# Patient Record
Sex: MALE | Race: BLACK OR AFRICAN AMERICAN | NOT HISPANIC OR LATINO | Employment: UNEMPLOYED | URBAN - METROPOLITAN AREA
[De-identification: names, ages, dates, MRNs, and addresses within clinical notes are randomized per-mention and may not be internally consistent; named-entity substitution may affect disease eponyms.]

---

## 2020-01-26 ENCOUNTER — HOSPITAL ENCOUNTER (EMERGENCY)
Facility: HOSPITAL | Age: 30
Discharge: HOME | End: 2020-01-27
Attending: EMERGENCY MEDICINE | Admitting: EMERGENCY MEDICINE
Payer: COMMERCIAL

## 2020-01-26 VITALS
TEMPERATURE: 98.2 F | WEIGHT: 235.67 LBS | SYSTOLIC BLOOD PRESSURE: 149 MMHG | RESPIRATION RATE: 20 BRPM | HEART RATE: 125 BPM | DIASTOLIC BLOOD PRESSURE: 93 MMHG | OXYGEN SATURATION: 99 %

## 2020-01-26 DIAGNOSIS — M62.830 SPASM OF THORACIC BACK MUSCLE: Primary | ICD-10-CM

## 2020-01-26 PROCEDURE — 99283 EMERGENCY DEPT VISIT LOW MDM: CPT

## 2020-01-26 PROCEDURE — 96372 THER/PROPH/DIAG INJ SC/IM: CPT

## 2020-01-26 PROCEDURE — 99284 EMERGENCY DEPT VISIT MOD MDM: CPT | Performed by: EMERGENCY MEDICINE

## 2020-01-26 RX ORDER — KETOROLAC TROMETHAMINE 30 MG/ML
30 INJECTION, SOLUTION INTRAMUSCULAR; INTRAVENOUS ONCE
Status: COMPLETED | OUTPATIENT
Start: 2020-01-26 | End: 2020-01-26

## 2020-01-26 RX ORDER — DIAZEPAM 5 MG/1
5 TABLET ORAL ONCE
Status: COMPLETED | OUTPATIENT
Start: 2020-01-26 | End: 2020-01-26

## 2020-01-26 RX ORDER — LIDOCAINE 50 MG/G
1 PATCH TOPICAL ONCE
Status: DISCONTINUED | OUTPATIENT
Start: 2020-01-26 | End: 2020-01-27 | Stop reason: HOSPADM

## 2020-01-26 RX ORDER — TRAMADOL HYDROCHLORIDE 50 MG/1
50 TABLET ORAL ONCE
Status: COMPLETED | OUTPATIENT
Start: 2020-01-26 | End: 2020-01-26

## 2020-01-26 RX ADMIN — TRAMADOL HYDROCHLORIDE 50 MG: 50 TABLET, FILM COATED ORAL at 23:22

## 2020-01-26 RX ADMIN — KETOROLAC TROMETHAMINE 30 MG: 30 INJECTION, SOLUTION INTRAMUSCULAR at 23:23

## 2020-01-26 RX ADMIN — LIDOCAINE 1 PATCH: 50 PATCH TOPICAL at 23:22

## 2020-01-26 RX ADMIN — DIAZEPAM 5 MG: 5 TABLET ORAL at 23:23

## 2020-01-27 RX ORDER — LIDOCAINE 50 MG/G
1 PATCH TOPICAL EVERY 24 HOURS
Qty: 30 PATCH | Refills: 0 | Status: SHIPPED | OUTPATIENT
Start: 2020-01-27 | End: 2020-02-26

## 2020-01-27 RX ORDER — IBUPROFEN 800 MG/1
800 TABLET ORAL EVERY 8 HOURS PRN
Qty: 30 TABLET | Refills: 0 | Status: SHIPPED | OUTPATIENT
Start: 2020-01-27 | End: 2020-02-06

## 2020-01-27 RX ORDER — DIAZEPAM 5 MG/1
5 TABLET ORAL 2 TIMES DAILY
Qty: 20 TABLET | Refills: 0 | Status: SHIPPED | OUTPATIENT
Start: 2020-01-27 | End: 2020-02-06

## 2020-01-27 RX ORDER — TRAMADOL HYDROCHLORIDE 50 MG/1
50 TABLET ORAL EVERY 6 HOURS PRN
Qty: 20 TABLET | Refills: 0 | Status: SHIPPED | OUTPATIENT
Start: 2020-01-27 | End: 2020-02-01

## 2020-01-27 NOTE — ED PROVIDER NOTES
History  Chief Complaint   Patient presents with    Back Pain     L lower back pain for 3 days after moving boxes at home  Increased pain with movement of L leg, bending, attempting to sit  31 yo male who reports 3 days of mild L sided lateral thoracic muscle spasms which have worsened  Reports carrying some heavy boxes and lundry up stair "but its weird, the plumbing in my house, you have to hold it over your head and twist oddly to make it to the top, I went up and down it so many times  History provided by:  Patient   used: No    Back Pain   Location:  Thoracic spine  Quality:  Stiffness (spasming)  Radiates to:  Does not radiate  Pain severity:  Severe  Onset quality:  Gradual  Duration:  3 days  Timing:  Constant  Progression:  Waxing and waning (but worsening)  Chronicity:  New  Relieved by:  Nothing  Worsened by:  Palpation and movement  Ineffective treatments:  NSAIDs and OTC medications  Associated symptoms: no abdominal pain, no bladder incontinence, no chest pain, no dysuria, no fever, no headaches, no leg pain and no paresthesias        None       Past Medical History:   Diagnosis Date    Hypertension     Psychiatric disorder        History reviewed  No pertinent surgical history  History reviewed  No pertinent family history  I have reviewed and agree with the history as documented  Social History     Tobacco Use    Smoking status: Current Some Day Smoker    Smokeless tobacco: Never Used   Substance Use Topics    Alcohol use: Yes     Comment: socially     Drug use: Not Currently        Review of Systems   Constitutional: Negative for chills and fever  HENT: Negative for congestion and sore throat  Eyes: Negative for visual disturbance  Respiratory: Negative for shortness of breath and wheezing  Cardiovascular: Negative for chest pain and palpitations  Gastrointestinal: Negative for abdominal pain, diarrhea, nausea and vomiting     Genitourinary: Negative for bladder incontinence and dysuria  Musculoskeletal: Positive for back pain  Negative for neck pain and neck stiffness  Skin: Negative for pallor and rash  Neurological: Negative for headaches and paresthesias  Psychiatric/Behavioral: Negative for confusion  All other systems reviewed and are negative  Physical Exam  Physical Exam   Constitutional: He is oriented to person, place, and time  He appears well-developed and well-nourished  He appears distressed (uncomfortable)  HENT:   Head: Normocephalic and atraumatic  Right Ear: External ear normal    Left Ear: External ear normal    Mouth/Throat: Oropharynx is clear and moist    Eyes: Pupils are equal, round, and reactive to light  EOM are normal    Neck: Normal range of motion  Neck supple  Cardiovascular: Normal rate and regular rhythm  No murmur heard  Pulmonary/Chest: Effort normal and breath sounds normal    Abdominal: Soft  Bowel sounds are normal  He exhibits no distension  There is no tenderness  Musculoskeletal: Normal range of motion  He exhibits tenderness (L mid thoracic lateral tenderness and muscle spasm)  He exhibits no edema  Neurological: He is alert and oriented to person, place, and time  Skin: Skin is warm  No rash noted  No pallor  Psychiatric: He has a normal mood and affect  His behavior is normal    Nursing note and vitals reviewed        Vital Signs  ED Triage Vitals [01/26/20 2301]   Temperature Pulse Respirations Blood Pressure SpO2   98 2 °F (36 8 °C) (!) 125 20 149/93 99 %      Temp Source Heart Rate Source Patient Position - Orthostatic VS BP Location FiO2 (%)   Temporal Monitor Sitting Right arm --      Pain Score       Worst Possible Pain           Vitals:    01/26/20 2301   BP: 149/93   Pulse: (!) 125   Patient Position - Orthostatic VS: Sitting         Visual Acuity      ED Medications  Medications   lidocaine (LIDODERM) 5 % patch 1 patch (1 patch Topical Medication Applied 1/26/20 2322) ketorolac (TORADOL) injection 30 mg (30 mg Intramuscular Given 1/26/20 2323)   traMADol (ULTRAM) tablet 50 mg (50 mg Oral Given 1/26/20 2322)   diazepam (VALIUM) tablet 5 mg (5 mg Oral Given 1/26/20 2323)       Diagnostic Studies  Results Reviewed     None                 No orders to display              Procedures  Procedures         ED Course  ED Course as of Jan 27 0245   Sun Jan 26, 2020   2309 Pt seen and examined  29 yo male who reports 3 days of mild L sided lateral thoracic muscle spasms which have worsened  Reports carrying some heavy boxes and lundry up stair "but its weird, the plumbing in my house, you have to hold it over your head and twist oddly to make it to the top, I went up and down it so many times  Will place lidoderm patch, give po valium, ultram and IM toradol  Mon Jan 27, 2020   0002 Pt feeling much better, friend and ride at bedside  MDM      Disposition  Final diagnoses:   Spasm of thoracic back muscle     Time reflects when diagnosis was documented in both MDM as applicable and the Disposition within this note     Time User Action Codes Description Comment    1/27/2020 12:22 AM Jef Bustamante Add [T92 253] Spasm of thoracic back muscle       ED Disposition     ED Disposition Condition Date/Time Comment    Discharge Stable Mon Jan 27, 2020 12:22 AM Muna Dillon discharge to home/self care              Follow-up Information     Follow up With Specialties Details Why Contact Info Additional Information    [facility] Physical Therapy Schedule an appointment as soon as possible for a visit   [phone]          Discharge Medication List as of 1/27/2020 12:26 AM      START taking these medications    Details   diazepam (VALIUM) 5 mg tablet Take 1 tablet (5 mg total) by mouth 2 (two) times a day for 10 days, Starting Mon 1/27/2020, Until Thu 2/6/2020, Normal      ibuprofen (MOTRIN) 800 mg tablet Take 1 tablet (800 mg total) by mouth every 8 (eight) hours as needed for mild pain or moderate pain for up to 10 days, Starting Mon 1/27/2020, Until Thu 2/6/2020, Normal      lidocaine (LIDODERM) 5 % Apply 1 patch topically every 24 hours Remove & Discard patch within 12 hours or as directed by MD, Starting Mon 1/27/2020, Until Wed 2/26/2020, Normal      traMADol (ULTRAM) 50 mg tablet Take 1 tablet (50 mg total) by mouth every 6 (six) hours as needed for moderate pain for up to 5 days, Starting Mon 1/27/2020, Until Sat 2/1/2020, Normal           No discharge procedures on file      ED Provider  Electronically Signed by           Yoselin Dumont DO  01/27/20 6728

## 2021-01-02 ENCOUNTER — HOSPITAL ENCOUNTER (EMERGENCY)
Facility: HOSPITAL | Age: 31
Discharge: HOME | End: 2021-01-02
Attending: EMERGENCY MEDICINE | Admitting: EMERGENCY MEDICINE
Payer: COMMERCIAL

## 2021-01-02 ENCOUNTER — APPOINTMENT (EMERGENCY)
Dept: CT IMAGING | Facility: HOSPITAL | Age: 31
End: 2021-01-02
Payer: COMMERCIAL

## 2021-01-02 VITALS
HEART RATE: 68 BPM | SYSTOLIC BLOOD PRESSURE: 143 MMHG | DIASTOLIC BLOOD PRESSURE: 97 MMHG | WEIGHT: 230.6 LBS | TEMPERATURE: 97.7 F | RESPIRATION RATE: 16 BRPM | OXYGEN SATURATION: 99 %

## 2021-01-02 DIAGNOSIS — R56.9 SEIZURE (HCC): Primary | ICD-10-CM

## 2021-01-02 DIAGNOSIS — Z87.898 HISTORY OF SEIZURE: ICD-10-CM

## 2021-01-02 LAB
ALBUMIN SERPL BCP-MCNC: 3.7 G/DL (ref 3.5–5)
ALP SERPL-CCNC: 67 U/L (ref 46–116)
ALT SERPL W P-5'-P-CCNC: 40 U/L (ref 12–78)
ANION GAP SERPL CALCULATED.3IONS-SCNC: 7 MMOL/L (ref 4–13)
APAP SERPL-MCNC: <2 UG/ML (ref 10–20)
APTT PPP: 29 SECONDS (ref 23–37)
AST SERPL W P-5'-P-CCNC: 37 U/L (ref 5–45)
BASOPHILS # BLD AUTO: 0.05 THOUSANDS/ΜL (ref 0–0.1)
BASOPHILS NFR BLD AUTO: 1 % (ref 0–1)
BILIRUB SERPL-MCNC: 0.32 MG/DL (ref 0.2–1)
BUN SERPL-MCNC: 10 MG/DL (ref 5–25)
CALCIUM SERPL-MCNC: 9.7 MG/DL (ref 8.3–10.1)
CHLORIDE SERPL-SCNC: 102 MMOL/L (ref 100–108)
CO2 SERPL-SCNC: 31 MMOL/L (ref 21–32)
CREAT SERPL-MCNC: 0.59 MG/DL (ref 0.6–1.3)
EOSINOPHIL # BLD AUTO: 0.21 THOUSAND/ΜL (ref 0–0.61)
EOSINOPHIL NFR BLD AUTO: 3 % (ref 0–6)
ERYTHROCYTE [DISTWIDTH] IN BLOOD BY AUTOMATED COUNT: 14.6 % (ref 11.6–15.1)
ETHANOL SERPL-MCNC: <3 MG/DL (ref 0–3)
GFR SERPL CREATININE-BSD FRML MDRD: 155 ML/MIN/1.73SQ M
GLUCOSE SERPL-MCNC: 91 MG/DL (ref 65–140)
HCT VFR BLD AUTO: 48.4 % (ref 36.5–49.3)
HGB BLD-MCNC: 15 G/DL (ref 12–17)
IMM GRANULOCYTES # BLD AUTO: 0.02 THOUSAND/UL (ref 0–0.2)
IMM GRANULOCYTES NFR BLD AUTO: 0 % (ref 0–2)
INR PPP: 0.97 (ref 0.84–1.19)
LYMPHOCYTES # BLD AUTO: 2.3 THOUSANDS/ΜL (ref 0.6–4.47)
LYMPHOCYTES NFR BLD AUTO: 30 % (ref 14–44)
MAGNESIUM SERPL-MCNC: 2.1 MG/DL (ref 1.6–2.6)
MCH RBC QN AUTO: 26.5 PG (ref 26.8–34.3)
MCHC RBC AUTO-ENTMCNC: 31 G/DL (ref 31.4–37.4)
MCV RBC AUTO: 86 FL (ref 82–98)
MONOCYTES # BLD AUTO: 0.51 THOUSAND/ΜL (ref 0.17–1.22)
MONOCYTES NFR BLD AUTO: 7 % (ref 4–12)
NEUTROPHILS # BLD AUTO: 4.57 THOUSANDS/ΜL (ref 1.85–7.62)
NEUTS SEG NFR BLD AUTO: 59 % (ref 43–75)
NRBC BLD AUTO-RTO: 0 /100 WBCS
PLATELET # BLD AUTO: 293 THOUSANDS/UL (ref 149–390)
PMV BLD AUTO: 10.3 FL (ref 8.9–12.7)
POTASSIUM SERPL-SCNC: 5.1 MMOL/L (ref 3.5–5.3)
PROT SERPL-MCNC: 8.4 G/DL (ref 6.4–8.2)
PROTHROMBIN TIME: 12.7 SECONDS (ref 11.6–14.5)
RBC # BLD AUTO: 5.65 MILLION/UL (ref 3.88–5.62)
SALICYLATES SERPL-MCNC: 3 MG/DL (ref 3–20)
SODIUM SERPL-SCNC: 140 MMOL/L (ref 136–145)
TSH SERPL DL<=0.05 MIU/L-ACNC: 2.55 UIU/ML (ref 0.36–3.74)
WBC # BLD AUTO: 7.66 THOUSAND/UL (ref 4.31–10.16)

## 2021-01-02 PROCEDURE — 80143 DRUG ASSAY ACETAMINOPHEN: CPT | Performed by: NURSE PRACTITIONER

## 2021-01-02 PROCEDURE — 99284 EMERGENCY DEPT VISIT MOD MDM: CPT

## 2021-01-02 PROCEDURE — 80179 DRUG ASSAY SALICYLATE: CPT | Performed by: NURSE PRACTITIONER

## 2021-01-02 PROCEDURE — 82077 ASSAY SPEC XCP UR&BREATH IA: CPT | Performed by: NURSE PRACTITIONER

## 2021-01-02 PROCEDURE — 85730 THROMBOPLASTIN TIME PARTIAL: CPT | Performed by: NURSE PRACTITIONER

## 2021-01-02 PROCEDURE — 99285 EMERGENCY DEPT VISIT HI MDM: CPT | Performed by: NURSE PRACTITIONER

## 2021-01-02 PROCEDURE — 36415 COLL VENOUS BLD VENIPUNCTURE: CPT | Performed by: NURSE PRACTITIONER

## 2021-01-02 PROCEDURE — 83735 ASSAY OF MAGNESIUM: CPT | Performed by: NURSE PRACTITIONER

## 2021-01-02 PROCEDURE — 84443 ASSAY THYROID STIM HORMONE: CPT | Performed by: NURSE PRACTITIONER

## 2021-01-02 PROCEDURE — 80053 COMPREHEN METABOLIC PANEL: CPT | Performed by: NURSE PRACTITIONER

## 2021-01-02 PROCEDURE — 70450 CT HEAD/BRAIN W/O DYE: CPT

## 2021-01-02 PROCEDURE — 96365 THER/PROPH/DIAG IV INF INIT: CPT

## 2021-01-02 PROCEDURE — 85610 PROTHROMBIN TIME: CPT | Performed by: NURSE PRACTITIONER

## 2021-01-02 PROCEDURE — 85025 COMPLETE CBC W/AUTO DIFF WBC: CPT | Performed by: NURSE PRACTITIONER

## 2021-01-02 PROCEDURE — G1004 CDSM NDSC: HCPCS

## 2021-01-02 RX ORDER — LEVETIRACETAM 500 MG/1
500 TABLET ORAL EVERY 12 HOURS SCHEDULED
Qty: 60 TABLET | Refills: 0 | Status: SHIPPED | OUTPATIENT
Start: 2021-01-02 | End: 2021-02-01

## 2021-01-02 RX ADMIN — LEVETIRACETAM 1500 MG: 100 INJECTION, SOLUTION INTRAVENOUS at 04:46

## 2021-01-02 NOTE — DISCHARGE INSTRUCTIONS
You have been given a loading dose of keppra through the IV  You are being prescribed keppra 500 mg every 12 hours - take as directed  Return to the ED if you have symptoms or recurrent seizures  You are to call the neurology/seizure physician for an appointment   You are to find a family doctor  Call your insurance to see who is listed for you    Sami Starks has been notified by law regarding your seizure- they will speak with you regarding your license

## 2021-01-02 NOTE — ED PROVIDER NOTES
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, and at some point started asking a coworker the same question repeatedly, and then walked to the back of the store  The coworker later told him she was calling out to him but he was not responding  He has a 4-hour span of time tonight that he can not recall  He does not think he fainted or fell at any point  To his knowledge, he has been having seizures since childhood, where he would "blackout" and not remember things  He denies history of tonic-clonic events  His last major seizure was 2 years ago, where he lost memory of approx 6 months of time  Pt reports he usually urinates during seizures, but he did not tonight  He c/o severe headache x 3 days was pain radiating down his posterior neck; denies changes in vision or hearing  He states headaches and heat can trigger his seizures  He also reports that he can smell and hear things that other people can't, and sees shadows on the floor that others can not see  Pt does not know if he has ever been on seizure meds in past        History provided by:  Medical records and patient   used: No    Seizure - Prior Hx Of  Seizure activity on arrival: no    Preceding symptoms: headache    Episode characteristics: disorientation    Duration:  4 hours  Timing:  Once  Number of seizures this episode:  1  Recent head injury:  No recent head injuries  History of seizures: yes        Prior to Admission Medications   Prescriptions Last Dose Informant Patient Reported?  Taking?   diazepam (VALIUM) 5 mg tablet   No No   Sig: Take 1 tablet (5 mg total) by mouth 2 (two) times a day for 10 days   ibuprofen (MOTRIN) 800 mg tablet   No No   Sig: Take 1 tablet (800 mg total) by mouth every 8 (eight) hours as needed for mild pain or moderate pain for up to 10 days   lidocaine (LIDODERM) 5 %   No No   Sig: Apply 1 patch topically every 24 hours Remove & Discard patch within 12 hours or as directed by MD      Facility-Administered Medications: None       Past Medical History:   Diagnosis Date    Hypertension     Psychiatric disorder     Seizures ([facility] [address] )        History reviewed  No pertinent surgical history  History reviewed  No pertinent family history  I have reviewed and agree with the history as documented  E-Cigarette/Vaping     E-Cigarette/Vaping Substances     Social History     Tobacco Use    Smoking status: Current Some Day Smoker    Smokeless tobacco: Never Used   Substance Use Topics    Alcohol use: Yes     Comment: socially     Drug use: Not Currently       Review of Systems   HENT: Negative for hearing loss  Eyes: Negative for visual disturbance  Musculoskeletal: Positive for neck pain  Posterior   Neurological: Positive for seizures, weakness and headaches  Negative for dizziness, tremors, syncope, facial asymmetry, speech difficulty, light-headedness and numbness  Right arm weakness x 1 year   All other systems reviewed and are negative  Physical Exam  Physical Exam  Vitals signs and nursing note reviewed  Constitutional:       General: He is awake  Appearance: Normal appearance  He is normal weight  HENT:      Head: Normocephalic and atraumatic  Eyes:      General: No visual field deficit  Neck:      Musculoskeletal: Normal range of motion and neck supple  Cardiovascular:      Rate and Rhythm: Normal rate and regular rhythm  Heart sounds: Normal heart sounds  Pulmonary:      Effort: Pulmonary effort is normal       Breath sounds: Normal breath sounds     Musculoskeletal: Normal range of motion  Skin:     General: Skin is warm  Neurological:      Mental Status: He is alert and oriented to person, place, and time  Cranial Nerves: No cranial nerve deficit, dysarthria or facial asymmetry  Sensory: No sensory deficit  Motor: Weakness present  No tremor  Comments: RUE strength decreased   Psychiatric:         Attention and Perception: Attention normal          Mood and Affect: Mood normal          Speech: Speech normal          Behavior: Behavior normal  Behavior is cooperative  Vital Signs  ED Triage Vitals   Temperature Pulse Respirations Blood Pressure SpO2   01/02/21 0238 01/02/21 0238 01/02/21 0238 01/02/21 0238 01/02/21 0238   97 7 °F (36 5 °C) 79 16 (!) 182/110 100 %      Temp src Heart Rate Source Patient Position - Orthostatic VS BP Location FiO2 (%)   -- 01/02/21 0325 01/02/21 0325 01/02/21 0325 --    Monitor Sitting Right arm       Pain Score       01/02/21 0238       5           Vitals:    01/02/21 0238 01/02/21 0325 01/02/21 0515   BP: (!) 182/110 136/93 143/97   Pulse: 79 73 68   Patient Position - Orthostatic VS:  Sitting Sitting         Visual Acuity  Visual Acuity      Most Recent Value   L Pupil Size (mm)  3   R Pupil Size (mm)  3          ED Medications  Medications   levETIRAcetam (KEPPRA) 1,500 mg in sodium chloride 0 9 % 100 mL IVPB (0 mg Intravenous Stopped 1/2/21 0540)       Diagnostic Studies  Results Reviewed     Procedure Component Value Units Date/Time    Acetaminophen level-If concentration is detectable, please discuss with medical  on call   [[id number]]  (Abnormal) Collected: 01/02/21 0322    Lab Status: Final result Specimen: Blood from Arm, Right Updated: 01/02/21 0427     Acetaminophen Level <2 ug/mL     TSH [[id number]]  (Normal) Collected: 01/02/21 0322    Lab Status: Final result Specimen: Blood from Arm, Right Updated: 01/02/21 0402     TSH 3RD GENERATON 2 546 uIU/mL     Narrative:      Patients undergoing fluorescein dye angiography may retain small amounts of fluorescein in the body for 48-72 hours post procedure  Samples containing fluorescein can produce falsely depressed TSH values  If the patient had this procedure,a specimen should be resubmitted post fluorescein clearance        Magnesium [[id number]]  (Normal) Collected: 01/02/21 0322    Lab Status: Final result Specimen: Blood from Arm, Right Updated: 01/02/21 0402     Magnesium 2 1 mg/dL     Salicylate level [[id number]]  (Normal) Collected: 01/02/21 0322    Lab Status: Final result Specimen: Blood from Arm, Right Updated: 06/82/60 0803     Salicylate Lvl 3 0 mg/dL     Comprehensive metabolic panel [[id number]]  (Abnormal) Collected: 01/02/21 0322    Lab Status: Final result Specimen: Blood from Arm, Right Updated: 01/02/21 0401     Sodium 140 mmol/L      Potassium 5 1 mmol/L      Chloride 102 mmol/L      CO2 31 mmol/L      ANION GAP 7 mmol/L      BUN 10 mg/dL      Creatinine 0 59 mg/dL      Glucose 91 mg/dL      Calcium 9 7 mg/dL      AST 37 U/L      ALT 40 U/L      Alkaline Phosphatase 67 U/L      Total Protein 8 4 g/dL      Albumin 3 7 g/dL      Total Bilirubin 0 32 mg/dL      eGFR 155 ml/min/1 73sq m     Narrative:      [place] guidelines for Chronic Kidney Disease (CKD):     Stage 1 with normal or high GFR (GFR > 90 mL/min/1 73 square meters)    Stage 2 Mild CKD (GFR = 60-89 mL/min/1 73 square meters)    Stage 3A Moderate CKD (GFR = 45-59 mL/min/1 73 square meters)    Stage 3B Moderate CKD (GFR = 30-44 mL/min/1 73 square meters)    Stage 4 Severe CKD (GFR = 15-29 mL/min/1 73 square meters)    Stage 5 End Stage CKD (GFR <15 mL/min/1 73 square meters)  Note: GFR calculation is accurate only with a steady state creatinine    Ethanol [[id number]]  (Normal) Collected: 01/02/21 0322    Lab Status: Final result Specimen: Blood from Arm, Right Updated: 01/02/21 0356     Ethanol Lvl <3 mg/dL     Protime-INR [[id number]]  (Normal) Collected: 01/02/21 0322    Lab Status: Final result Specimen: Blood from Arm, Right Updated: 01/02/21 0343     Protime 12 7 seconds      INR 0 97    APTT [[id number]]  (Normal) Collected: 01/02/21 0322    Lab Status: Final result Specimen: Blood from Arm, Right Updated: 01/02/21 0343     PTT 29 seconds     CBC and differential [[id number]]  (Abnormal) Collected: 01/02/21 0322    Lab Status: Final result Specimen: Blood from Arm, Right Updated: 01/02/21 0334     WBC 7 66 Thousand/uL      RBC 5 65 Million/uL      Hemoglobin 15 0 g/dL      Hematocrit 48 4 %      MCV 86 fL      MCH 26 5 pg      MCHC 31 0 g/dL      RDW 14 6 %      MPV 10 3 fL      Platelets 146 Thousands/uL      nRBC 0 /100 WBCs      Neutrophils Relative 59 %      Immat GRANS % 0 %      Lymphocytes Relative 30 %      Monocytes Relative 7 %      Eosinophils Relative 3 %      Basophils Relative 1 %      Neutrophils Absolute 4 57 Thousands/µL      Immature Grans Absolute 0 02 Thousand/uL      Lymphocytes Absolute 2 30 Thousands/µL      Monocytes Absolute 0 51 Thousand/µL      Eosinophils Absolute 0 21 Thousand/µL      Basophils Absolute 0 05 Thousands/µL     Rapid drug screen, urine [[id number]]     Lab Status: No result Specimen: Urine     POCT urinalysis dipstick [[id number]]     Lab Status: No result Specimen: Urine                  CT head without contrast   Final Result by Elina Almazan MD (01/02 0406)      No acute intracranial abnormality  Workstation performed: [id number]                    Procedures  Procedures         ED Course  ED Course as of Jan 02 0617   Sat Jan 02, 2021   0411 FINDINGS:     PARENCHYMA:  No intracranial mass, mass effect or midline shift  No CT signs of acute infarction    No acute parenchymal hemorrhage      VENTRICLES AND EXTRA-AXIAL SPACES:  Normal for the patient's age      VISUALIZED ORBITS AND PARANASAL SINUSES:  Unremarkable      CALVARIUM AND EXTRACRANIAL SOFT TISSUES:  Normal      IMPRESSION:     No acute intracranial abnormality          0411 CT head negative  Labs unremarkable  All labs and radiology results reviewed - will speak with epileptologist on call  56 Spoke with Dr Heather Zamarripa regarding case and pt seizure history and no medications  She states to give 1500 mg Keppra IV and then keppra 500 q 12 hours and he is follow up with neuro/seizure as out patient  0526 Pt states he is no longer dizzy  He states he is feeling well and understands all discharge instructions and follow up  Pt has a ride home  Pt A&O x 3 no further seizures noted while in ED  Pt is stable and ambulatory at discharge  /97 (BP Location: Right arm)   Pulse 68   Temp 97 7 °F (36 5 °C)   Resp 16   Wt 105 kg (230 lb 9 6 oz)   SpO2 99%                           SBIRT 20yo+      Most Recent Value   SBIRT (25 yo +)   In order to provide better care to our patients, we are screening all of our patients for alcohol and drug use  Would it be okay to ask you these screening questions? No Filed at: 01/02/2021 0303                    MDM  Number of Diagnoses or Management Options  Diagnosis management comments: Seizure    DDX:  Seizure  Psychological disorder  Stroke    Plan:  Labs  CT of head  [employer] form completed and submitted      Disposition  Final diagnoses:   Seizure ([facility] [place] [address] )   History of seizure     Time reflects when diagnosis was documented in both MDM as applicable and the Disposition within this note     Time User Action Codes Description Comment    1/2/2021  4:26 AM Hammond Formica Add [R56 9] Seizure ([address] )     1/2/2021  4:35 AM Hammond Formica Add [Z61 495] History of seizure       ED Disposition     ED Disposition Condition Date/Time Comment    Discharge Stable Sat Jan 2, 2021  5:29 AM Aristides Reusing discharge to home/self care  Follow-up Information     Follow up With Specialties Details Why Contact Info Additional Information    [employer]fol[employer]   you need to find a family doctor  You are to call your health insurance as well to see who is listed  Antoinette Dee MD Neurology Schedule an appointment as soon as possible for a visit in 2 days you need to see the epilepsy group for your seizures [address] [facility] [phone]       [facility] Emergency Department Emergency Medicine  If symptoms worsen [facility] [id number]  [place] [address] ED, [address] [facility] , [place], [place], [address]          Discharge Medication List as of 1/2/2021  5:29 AM      START taking these medications    Details   levETIRAcetam (KEPPRA) 500 mg tablet Take 1 tablet (500 mg total) by mouth every 12 (twelve) hours, Starting Sat 1/2/2021, Until Mon 2/1/2021, Normal         CONTINUE these medications which have NOT CHANGED    Details   diazepam (VALIUM) 5 mg tablet Take 1 tablet (5 mg total) by mouth 2 (two) times a day for 10 days, Starting Mon 1/27/2020, Until Thu 2/6/2020, Normal      ibuprofen (MOTRIN) 800 mg tablet Take 1 tablet (800 mg total) by mouth every 8 (eight) hours as needed for mild pain or moderate pain for up to 10 days, Starting Mon 1/27/2020, Until Thu 2/6/2020, Normal      lidocaine (LIDODERM) 5 % Apply 1 patch topically every 24 hours Remove & Discard patch within 12 hours or as directed by MD, Starting Mon 1/27/2020, Until Wed 2/26/2020, Normal           No discharge procedures on file      PDMP Review     None          ED Provider  Electronically Signed by           Dhiraj Luna  01/02/21 6499

## 2021-01-02 NOTE — Clinical Note
Abdi Cuellar was seen and treated in our emergency department on 1/2/2021  Diagnosis:     [place]  may return to work on return date  He may return on this date: 01/04/2021         If you have any questions or concerns, please don't hesitate to call        FRANTZ Carlisle    ______________________________           _______________          _______________  Hospital Representative                              Date                                Time

## 2021-12-29 ENCOUNTER — OFFICE VISIT (OUTPATIENT)
Dept: DENTISTRY | Facility: CLINIC | Age: 31
End: 2021-12-29

## 2021-12-29 VITALS — SYSTOLIC BLOOD PRESSURE: 134 MMHG | HEART RATE: 73 BPM | TEMPERATURE: 96.8 F | DIASTOLIC BLOOD PRESSURE: 97 MMHG

## 2021-12-29 DIAGNOSIS — K02.61 DENTAL CARIES ON SMOOTH SURFACE LIMITED TO ENAMEL: Primary | ICD-10-CM

## 2021-12-29 RX ORDER — AMOXICILLIN 500 MG/1
500 CAPSULE ORAL EVERY 8 HOURS SCHEDULED
Qty: 21 CAPSULE | Refills: 0 | Status: SHIPPED | OUTPATIENT
Start: 2021-12-29 | End: 2022-01-05

## 2021-12-29 NOTE — PROGRESS NOTES
* pt scheduled for emergency exam and evaluation today  Filters are not working today so unable to do surgical procedures in clinic  CC- TA upper left area; pt states tooth has been broken for several years; now increasing discomfort and very careful eating on left side  Panorex- clear bilateral max sinus; tmj within normal limits; Caries #16/impacted #17/#1 and #32  PMHX- h/o seizures;see epic notes; denies taking any anti seizure meds; denies any history of drug allergies today  EOE- nsf; no swelling noted; able to open mouth without discomfort or limitations; able to eat and drink; no acute distress  IOE- Full compliment of adult dentition; fractured #16; large extensive decay present on tooth; + percussion; no mobility #16 noted  Gingiva is slightly swollen and growing over this tooth  DX- fractured/craious non restorable wisdom tooth #16  REC- surgical extraction #16 and evaluation with oral surgery  * referred out due to possible complication and history of uncontrollable seizures in past    *referrral given to patient and copy of panorex    NV- comprehensive exam and cleaning

## 2022-01-13 ENCOUNTER — OFFICE VISIT (OUTPATIENT)
Dept: DENTISTRY | Facility: CLINIC | Age: 32
End: 2022-01-13

## 2022-01-13 VITALS — TEMPERATURE: 96.4 F

## 2022-01-13 DIAGNOSIS — Z01.21 ENCOUNTER FOR DENTAL EXAMINATION AND CLEANING WITH ABNORMAL FINDINGS: Primary | ICD-10-CM

## 2022-01-13 PROCEDURE — D0274 BITEWINGS - 4 RADIOGRAPHIC IMAGES: HCPCS | Performed by: DENTIST

## 2022-01-14 NOTE — PROGRESS NOTES
Pt presents to clinic for comprehensive exam with chief complaint of pain in the upper left posterior  Medical history reviewed  Pt is current smoker  Not currently trying to quit  Tobacco counseling performed  EOE: WNL    IOE  Soft Tissue: Oral cancer screening WNL  Generalized gingival inflammation  Hard Tissue: #16 decayed and broken down  Impacted #1  Partially erupted #17, 32    Bitewings and Pan  Findings: caries charted  Generalized heavy subgingival calculus  Generalized moderate bone loss  Full mouth perio charting not completed due to heavy calculus deposits  Discussed findings with pt  Tx options presented  Phase 1: Disease control (full mouth debridement), ext of 3rds  Phase 2: Caries Control   Phase 3: Maintenance    Explained to pt he is in need of full mouth debridement, then we will re-eval his periodontal status and complete full perio charting  Discussed pt's periodontal status, etiology of periodontal disease, and changes pt should make  Pt has referral for OS from previous apt       Nv: Full mouth debridement  Ww: Dr Rosita Sy

## 2023-01-01 ENCOUNTER — HOSPITAL ENCOUNTER (EMERGENCY)
Facility: HOSPITAL | Age: 33
Discharge: HOME | End: 2023-01-01
Attending: EMERGENCY MEDICINE

## 2023-01-01 VITALS
DIASTOLIC BLOOD PRESSURE: 83 MMHG | HEART RATE: 59 BPM | SYSTOLIC BLOOD PRESSURE: 139 MMHG | OXYGEN SATURATION: 99 % | RESPIRATION RATE: 18 BRPM | TEMPERATURE: 98 F

## 2023-01-01 DIAGNOSIS — K08.89 TOOTHACHE: Primary | ICD-10-CM

## 2023-01-01 RX ORDER — AMOXICILLIN 500 MG/1
500 CAPSULE ORAL 3 TIMES DAILY
Qty: 21 CAPSULE | Refills: 0 | Status: SHIPPED | OUTPATIENT
Start: 2023-01-01 | End: 2023-01-08

## 2023-01-01 RX ORDER — LIDOCAINE HYDROCHLORIDE 20 MG/ML
SOLUTION OROPHARYNGEAL
Qty: 100 ML | Refills: 0 | Status: SHIPPED | OUTPATIENT
Start: 2023-01-01 | End: 2023-01-01 | Stop reason: SDUPTHER

## 2023-01-01 RX ORDER — LIDOCAINE HYDROCHLORIDE 20 MG/ML
15 SOLUTION OROPHARYNGEAL ONCE
Status: COMPLETED | OUTPATIENT
Start: 2023-01-01 | End: 2023-01-01

## 2023-01-01 RX ORDER — IBUPROFEN 600 MG/1
600 TABLET ORAL EVERY 6 HOURS PRN
Qty: 30 TABLET | Refills: 0 | Status: SHIPPED | OUTPATIENT
Start: 2023-01-01 | End: 2023-01-01 | Stop reason: SDUPTHER

## 2023-01-01 RX ORDER — LIDOCAINE HYDROCHLORIDE 20 MG/ML
SOLUTION OROPHARYNGEAL
Qty: 100 ML | Refills: 0 | Status: SHIPPED | OUTPATIENT
Start: 2023-01-01

## 2023-01-01 RX ORDER — KETOROLAC TROMETHAMINE 30 MG/ML
15 INJECTION, SOLUTION INTRAMUSCULAR; INTRAVENOUS ONCE
Status: COMPLETED | OUTPATIENT
Start: 2023-01-01 | End: 2023-01-01

## 2023-01-01 RX ORDER — AMOXICILLIN 500 MG/1
500 CAPSULE ORAL 3 TIMES DAILY
Qty: 21 CAPSULE | Refills: 0 | Status: SHIPPED | OUTPATIENT
Start: 2023-01-01 | End: 2023-01-01 | Stop reason: SDUPTHER

## 2023-01-01 RX ORDER — IBUPROFEN 600 MG/1
600 TABLET ORAL EVERY 6 HOURS PRN
Qty: 30 TABLET | Refills: 0 | Status: SHIPPED | OUTPATIENT
Start: 2023-01-01 | End: 2023-01-11

## 2023-01-01 RX ADMIN — LIDOCAINE HYDROCHLORIDE 15 ML: 20 SOLUTION ORAL at 16:57

## 2023-01-01 RX ADMIN — KETOROLAC TROMETHAMINE 15 MG: 30 INJECTION, SOLUTION INTRAMUSCULAR; INTRAVENOUS at 16:57

## 2023-01-01 NOTE — DISCHARGE INSTRUCTIONS
Amoxil as directed  Ibuprofen as needed for pain  Apply topical lidocaine as needed  FU with the dentist/oral surgery

## 2023-01-01 NOTE — ED PROVIDER NOTES
History  Chief Complaint   Patient presents with   • Dental Pain     Pt presents to the ED for left upper dental pain - was to see a dentist but went in and they said they didn't actually take his insurance  - having sharp stabbing pain  Has been int for weeks but worse today  No fevers  Taking tylenol and icing area - with out relief  Prior to Admission Medications   Prescriptions Last Dose Informant Patient Reported? Taking?   diazepam (VALIUM) 5 mg tablet   No No   Sig: Take 1 tablet (5 mg total) by mouth 2 (two) times a day for 10 days   levETIRAcetam (KEPPRA) 500 mg tablet   No No   Sig: Take 1 tablet (500 mg total) by mouth every 12 (twelve) hours   lidocaine (LIDODERM) 5 %   No No   Sig: Apply 1 patch topically every 24 hours Remove & Discard patch within 12 hours or as directed by MD      Facility-Administered Medications: None       Past Medical History:   Diagnosis Date   • Hypertension    • Psychiatric disorder    • Seizures ([facility] [address] )        No past surgical history on file  No family history on file  I have reviewed and agree with the history as documented  E-Cigarette/Vaping     E-Cigarette/Vaping Substances     Social History     Tobacco Use   • Smoking status: Some Days   • Smokeless tobacco: Never   Substance Use Topics   • Alcohol use: Yes     Comment: socially    • Drug use: Not Currently       Review of Systems   Constitutional: Negative for fever  HENT: Positive for dental problem  Respiratory: Negative  Cardiovascular: Negative  Gastrointestinal: Negative  All other systems reviewed and are negative  Physical Exam  Physical Exam  Vitals and nursing note reviewed  Constitutional:       Appearance: He is well-developed  HENT:      Head: Normocephalic and atraumatic        Right Ear: Ear canal and external ear normal       Left Ear: Ear canal and external ear normal       Mouth/Throat:     Eyes:      Conjunctiva/sclera: Conjunctivae normal    Cardiovascular: Rate and Rhythm: Normal rate and regular rhythm  Heart sounds: Normal heart sounds  Pulmonary:      Effort: Pulmonary effort is normal       Breath sounds: Normal breath sounds  Abdominal:      General: Bowel sounds are normal       Palpations: Abdomen is soft  Musculoskeletal:         General: Normal range of motion  Cervical back: Normal range of motion and neck supple  Lymphadenopathy:      Cervical: No cervical adenopathy  Skin:     General: Skin is warm  Findings: No rash  Neurological:      Mental Status: He is alert and oriented to person, place, and time  Motor: No abnormal muscle tone  Coordination: Coordination normal    Psychiatric:         Behavior: Behavior normal          Vital Signs  ED Triage Vitals   Temperature Pulse Respirations Blood Pressure SpO2   01/01/23 1704 01/01/23 1704 01/01/23 1704 01/01/23 1704 01/01/23 1704   98 °F (36 7 °C) 59 18 139/83 99 %      Temp Source Heart Rate Source Patient Position - Orthostatic VS BP Location FiO2 (%)   01/01/23 1704 01/01/23 1704 01/01/23 1704 01/01/23 1704 --   Oral Monitor Lying Left arm       Pain Score       01/01/23 1657       10 - Worst Possible Pain           Vitals:    01/01/23 1704   BP: 139/83   Pulse: 59   Patient Position - Orthostatic VS: Lying         Visual Acuity      ED Medications  Medications   ketorolac (TORADOL) injection 15 mg (15 mg Intramuscular Given 1/1/23 1657)   Lidocaine Viscous HCl (XYLOCAINE) 2 % mucosal solution 15 mL (15 mL Swish & Spit Given 1/1/23 1657)       Diagnostic Studies  Results Reviewed     None                 No orders to display              Procedures  Procedures         ED Course                               SBIRT 20yo+    Flowsheet Row Most Recent Value   SBIRT (23 yo +)    In order to provide better care to our patients, we are screening all of our patients for alcohol and drug use  Would it be okay to ask you these screening questions?  No Filed at: 01/01/2023 4844 Medical Decision Making  Toothache: acute illness or injury  Amount and/or Complexity of Data Reviewed  Discussion of management or test interpretation with external provider(s): No sign dental abscess - will treat  And gave contact for FU    Risk  OTC drugs  Prescription drug management  Disposition  Final diagnoses:   Toothache     Time reflects when diagnosis was documented in both MDM as applicable and the Disposition within this note     Time User Action Codes Description Comment    1/1/2023  4:29 PM Summer Gan Add [K08 89] Toothache       ED Disposition     ED Disposition   Discharge    Condition   Stable    Date/Time   Sun Jan 1, 2023  4:29 PM    Comment   Heena Balling discharge to home/self care                 Follow-up Information     Follow up With Specialties Details Why Contact Info Additional Information    [address] [facility]    [address] [id number]  [address] [facility]   [facility] [id number]  [facility] [address], [address] [place] [address], [place], [place], [address], [phone]          Discharge Medication List as of 1/1/2023  4:30 PM      START taking these medications    Details   amoxicillin (AMOXIL) 500 mg capsule Take 1 capsule (500 mg total) by mouth 3 (three) times a day for 7 days, Starting Sun 1/1/2023, Until Sun 1/8/2023, Normal      ibuprofen (MOTRIN) 600 mg tablet Take 1 tablet (600 mg total) by mouth every 6 (six) hours as needed for mild pain for up to 10 days, Starting Sun 1/1/2023, Until Wed 1/11/2023 at 2359, Normal      Lidocaine Viscous HCl (XYLOCAINE) 2 % mucosal solution Apply small amount to affected area as needed, Normal         CONTINUE these medications which have NOT CHANGED    Details   diazepam (VALIUM) 5 mg tablet Take 1 tablet (5 mg total) by mouth 2 (two) times a day for 10 days, Starting Mon 1/27/2020, Until Thu 2/6/2020, Normal      levETIRAcetam (KEPPRA) 500 mg tablet Take 1 tablet (500 mg total) by mouth every 12 (twelve) hours, Starting Sat 1/2/2021, Until Mon 2/1/2021, Normal      lidocaine (LIDODERM) 5 % Apply 1 patch topically every 24 hours Remove & Discard patch within 12 hours or as directed by MD, Starting Mon 1/27/2020, Until Wed 2/26/2020, Normal             No discharge procedures on file      PDMP Review     None          ED Provider  Electronically Signed by           Deyanira Wilkerson PA-C  01/01/23 3459

## 2023-01-24 ENCOUNTER — HOSPITAL ENCOUNTER (EMERGENCY)
Facility: HOSPITAL | Age: 33
Discharge: HOME | End: 2023-01-24
Attending: EMERGENCY MEDICINE

## 2023-01-24 VITALS
OXYGEN SATURATION: 100 % | WEIGHT: 243.83 LBS | HEART RATE: 80 BPM | SYSTOLIC BLOOD PRESSURE: 138 MMHG | DIASTOLIC BLOOD PRESSURE: 88 MMHG | RESPIRATION RATE: 16 BRPM | HEIGHT: 68 IN | TEMPERATURE: 98.5 F | BODY MASS INDEX: 36.95 KG/M2

## 2023-01-24 DIAGNOSIS — K04.7 DENTAL ABSCESS: Primary | ICD-10-CM

## 2023-01-24 RX ORDER — IBUPROFEN 600 MG/1
600 TABLET ORAL EVERY 6 HOURS PRN
Qty: 30 TABLET | Refills: 0 | Status: SHIPPED | OUTPATIENT
Start: 2023-01-24 | End: 2023-02-03

## 2023-01-24 RX ORDER — BUPIVACAINE HYDROCHLORIDE 2.5 MG/ML
5 INJECTION, SOLUTION EPIDURAL; INFILTRATION; INTRACAUDAL ONCE
Status: COMPLETED | OUTPATIENT
Start: 2023-01-24 | End: 2023-01-24

## 2023-01-24 RX ORDER — CLINDAMYCIN HYDROCHLORIDE 150 MG/1
300 CAPSULE ORAL ONCE
Status: COMPLETED | OUTPATIENT
Start: 2023-01-24 | End: 2023-01-24

## 2023-01-24 RX ORDER — LIDOCAINE HYDROCHLORIDE 20 MG/ML
SOLUTION OROPHARYNGEAL
Qty: 100 ML | Refills: 0 | Status: SHIPPED | OUTPATIENT
Start: 2023-01-24

## 2023-01-24 RX ORDER — CLINDAMYCIN HYDROCHLORIDE 300 MG/1
300 CAPSULE ORAL 4 TIMES DAILY
Qty: 40 CAPSULE | Refills: 0 | Status: SHIPPED | OUTPATIENT
Start: 2023-01-24 | End: 2023-02-03

## 2023-01-24 RX ORDER — LIDOCAINE HYDROCHLORIDE 20 MG/ML
15 SOLUTION OROPHARYNGEAL ONCE
Status: COMPLETED | OUTPATIENT
Start: 2023-01-24 | End: 2023-01-24

## 2023-01-24 RX ADMIN — LIDOCAINE HYDROCHLORIDE 15 ML: 20 SOLUTION ORAL; TOPICAL at 11:21

## 2023-01-24 RX ADMIN — BUPIVACAINE HYDROCHLORIDE 5 ML: 2.5 INJECTION, SOLUTION EPIDURAL; INFILTRATION; INTRACAUDAL; PERINEURAL at 11:21

## 2023-01-24 RX ADMIN — CLINDAMYCIN HYDROCHLORIDE 300 MG: 150 CAPSULE ORAL at 11:21

## 2024-01-17 ENCOUNTER — TELEPHONE (OUTPATIENT)
Age: 34
End: 2024-01-17

## 2024-11-21 ENCOUNTER — HOSPITAL ENCOUNTER (EMERGENCY)
Facility: HOSPITAL | Age: 34
Discharge: HOME | End: 2024-11-22
Attending: EMERGENCY MEDICINE
Payer: MEDICARE

## 2024-11-21 VITALS
BODY MASS INDEX: 34.76 KG/M2 | TEMPERATURE: 98 F | RESPIRATION RATE: 18 BRPM | WEIGHT: 228.62 LBS | HEART RATE: 94 BPM | SYSTOLIC BLOOD PRESSURE: 132 MMHG | OXYGEN SATURATION: 98 % | DIASTOLIC BLOOD PRESSURE: 81 MMHG

## 2024-11-21 DIAGNOSIS — S39.012A STRAIN OF LUMBAR REGION, INITIAL ENCOUNTER: Primary | ICD-10-CM

## 2024-11-21 PROCEDURE — 99283 EMERGENCY DEPT VISIT LOW MDM: CPT

## 2024-11-22 ENCOUNTER — APPOINTMENT (EMERGENCY)
Dept: CT IMAGING | Facility: HOSPITAL | Age: 34
End: 2024-11-22
Payer: MEDICARE

## 2024-11-22 ENCOUNTER — HOSPITAL ENCOUNTER (OUTPATIENT)
Facility: HOSPITAL | Age: 34
Setting detail: OBSERVATION
Discharge: HOME | End: 2024-11-24
Admitting: INTERNAL MEDICINE
Payer: MEDICARE

## 2024-11-22 DIAGNOSIS — E55.9 VITAMIN D DEFICIENCY: ICD-10-CM

## 2024-11-22 DIAGNOSIS — M54.40 MIDLINE LOW BACK PAIN WITH SCIATICA, SCIATICA LATERALITY UNSPECIFIED, UNSPECIFIED CHRONICITY: ICD-10-CM

## 2024-11-22 DIAGNOSIS — M54.50 LOW BACK PAIN: Primary | ICD-10-CM

## 2024-11-22 DIAGNOSIS — M54.40 ACUTE RIGHT-SIDED LOW BACK PAIN WITH SCIATICA, SCIATICA LATERALITY UNSPECIFIED: ICD-10-CM

## 2024-11-22 PROCEDURE — 99285 EMERGENCY DEPT VISIT HI MDM: CPT

## 2024-11-22 PROCEDURE — 96372 THER/PROPH/DIAG INJ SC/IM: CPT

## 2024-11-22 PROCEDURE — 99284 EMERGENCY DEPT VISIT MOD MDM: CPT

## 2024-11-22 PROCEDURE — 72131 CT LUMBAR SPINE W/O DYE: CPT

## 2024-11-22 RX ORDER — LIDOCAINE 50 MG/G
1 PATCH TOPICAL EVERY 24 HOURS
Qty: 15 PATCH | Refills: 0 | Status: SHIPPED | OUTPATIENT
Start: 2024-11-22 | End: 2024-11-24

## 2024-11-22 RX ORDER — LIDOCAINE 50 MG/G
1 PATCH TOPICAL ONCE
Status: DISCONTINUED | OUTPATIENT
Start: 2024-11-22 | End: 2024-11-22 | Stop reason: HOSPADM

## 2024-11-22 RX ORDER — METHOCARBAMOL 500 MG/1
500 TABLET, FILM COATED ORAL 2 TIMES DAILY
Qty: 20 TABLET | Refills: 0 | Status: SHIPPED | OUTPATIENT
Start: 2024-11-22 | End: 2024-11-23 | Stop reason: ALTCHOICE

## 2024-11-22 RX ORDER — ACETAMINOPHEN 325 MG/1
975 TABLET ORAL ONCE
Status: COMPLETED | OUTPATIENT
Start: 2024-11-22 | End: 2024-11-22

## 2024-11-22 RX ORDER — KETOROLAC TROMETHAMINE 30 MG/ML
15 INJECTION, SOLUTION INTRAMUSCULAR; INTRAVENOUS ONCE
Status: COMPLETED | OUTPATIENT
Start: 2024-11-22 | End: 2024-11-22

## 2024-11-22 RX ADMIN — ACETAMINOPHEN 975 MG: 325 TABLET ORAL at 00:42

## 2024-11-22 RX ADMIN — LIDOCAINE 1 PATCH: 50 PATCH TOPICAL at 00:43

## 2024-11-22 RX ADMIN — KETOROLAC TROMETHAMINE 15 MG: 30 INJECTION, SOLUTION INTRAMUSCULAR; INTRAVENOUS at 00:44

## 2024-11-22 NOTE — ED PROVIDER NOTES
Time reflects when diagnosis was documented in both MDM as applicable and the Disposition within this note       Time User Action Codes Description Comment    11/22/2024  1:48 AM Primitivo Merida Add [S39.012A] Strain of lumbar region, initial encounter           ED Disposition       ED Disposition   Discharge    Condition   Stable    Date/Time   Fri Nov 22, 2024  1:46 AM    Comment   Anabell Ray discharge to home/self care.                   Assessment & Plan       Medical Decision Making  Patient is a 34-year-old male presenting emergency department with lower back pain for the past 2 days.  Patient states that he works for a [employer] and gets this every year during his 2 months off.  Patient states he woke up 1 day out of bed with increased back pain.  Patient denies any injury or lifting injury to the pain. Patient showed bilateral paraspinous muscle tenderness in lumbar region with midline tenderness overlying the lumbar vertebrae.  Patient noted stepping gait secondary to pain. DDx including but not limited to: sciatica, herniated disc, arthritis, spinal stenosis, strain, sprain, fracture, cauda equina syndrome, epidural abscess, transverse myelitis, AAA.  Due to midline tenderness CT of lumbar spine is ordered.  Lumbar spine showed no acute vascular abnormality, or acute foraminal narrowing.  Findings consistent with sciatica/lower lumbar strain.  Patient started on multimodal pain approach, patient given lidocaine patch, Toradol, acetaminophen.  Due to patient driving home no most Rx was given during visit.  Patient noted improvement of pain with and improvement in ambulation.  Patient started on Tylenol, Motrin, methocarbamol, and lidocaine patches for multimodal pain approach to lower back pain.  Patient given amatory referral to comprehensive spine program.  Discussed with patient to apply warm or cool compresses affected area for further nonpharmacologic relief.  Patient given strict return  precautions to return to the emergency department for increasing fever, body aches, chills, chest pain, palpitation, shortness of breath, urinary bowel incontinence, weakness of the bilateral extremities, numbness in bilateral lower extremities, difficulty walking or ambulating.  Patient verbalized understanding and agrees to current plan.  Patient discharged home stable condition at this time.    Amount and/or Complexity of Data Reviewed  Radiology: ordered.    Risk  OTC drugs.  Prescription drug management.             Medications   lidocaine (LIDODERM) 5 % patch 1 patch (1 patch Topical Medication Applied 11/22/24 0043)   ketorolac (TORADOL) injection 15 mg (15 mg Intramuscular Given 11/22/24 0044)   acetaminophen (TYLENOL) tablet 975 mg (975 mg Oral Given 11/22/24 0042)       ED Risk Strat Scores                                               History of Present Illness       Chief Complaint   Patient presents with    Back Pain     Patient reports back pain for the past 2 days, patient states he believes he slept wrong, denies injury, denies urinary symptoms        Past Medical History:   Diagnosis Date    Hypertension     Psychiatric disorder     Seizures ([facility])       No past surgical history on file.   No family history on file.   Social History     Tobacco Use    Smoking status: Some Days    Smokeless tobacco: Never   Substance Use Topics    Alcohol use: Yes     Comment: socially     Drug use: Not Currently      E-Cigarette/Vaping      E-Cigarette/Vaping Substances      I have reviewed and agree with the history as documented.     Patient is a 34-year-old male presenting emergency department with lower back pain for the past 2 days.  Patient states that he works for a [employer] and gets this every year during his 2 months off.  Patient states he woke up 1 day out of bed with increased back pain.  Patient denies any injury or lifting injury to the pain.  Patient states that the pain is 10 out of 10 and causes him  to be unable to walk.  Patient states that he did fall secondary to pain.  Patient states that he has such a strong sense of pain that he became weak in his legs and fell to his knees.  Patient denies taking medications prior to arrival.  Patient states that in the past they prescribed a muscle relaxer which has completely resolved his pain.  Patient denies seeing any specialist since then.  Patient denies any fever, body aches, chills, chest pain, palpitations, shortness of breath, nausea, vomiting, diarrhea, dysuria, hematuria, urinary or bowel incontinence, or saddle anesthesia at this time.          Review of Systems   Constitutional:  Negative for chills, diaphoresis, fatigue and fever.   HENT:  Negative for congestion, dental problem, drooling, ear discharge, ear pain, postnasal drip, rhinorrhea, sinus pressure, sinus pain and sore throat.    Eyes:  Negative for pain, discharge, itching and visual disturbance.   Respiratory:  Negative for cough and shortness of breath.    Cardiovascular:  Negative for chest pain and palpitations.   Gastrointestinal:  Negative for abdominal pain, constipation, diarrhea and vomiting.   Genitourinary:  Negative for difficulty urinating, dysuria, flank pain, frequency, hematuria and urgency.   Musculoskeletal:  Positive for back pain and gait problem. Negative for arthralgias.   Skin:  Negative for color change and rash.   Neurological:  Negative for dizziness, seizures, syncope, facial asymmetry, weakness, numbness and headaches.   All other systems reviewed and are negative.          Objective       ED Triage Vitals [11/21/24 2333]   Temperature Pulse Blood Pressure Respirations SpO2 Patient Position - Orthostatic VS   98 °F (36.7 °C) 94 132/81 18 98 % Sitting      Temp src Heart Rate Source BP Location FiO2 (%) Pain Score    -- Monitor Right arm -- 10 - Worst Possible Pain      Vitals      Date and Time Temp Pulse SpO2 Resp BP Pain Score FACES Pain Rating User   11/22/24 0042  -- -- -- -- -- 8 -- TS   11/21/24 2333 98 °F (36.7 °C) 94 98 % 18 132/81 10 - Worst Possible Pain -- BLG            Physical Exam  Vitals and nursing note reviewed.   Constitutional:       General: He is not in acute distress.     Appearance: Normal appearance. He is well-developed. He is not ill-appearing.   HENT:      Head: Normocephalic and atraumatic.      Right Ear: Tympanic membrane normal. There is no impacted cerumen.      Left Ear: Tympanic membrane normal. There is no impacted cerumen.      Nose: Nose normal. No congestion or rhinorrhea.      Mouth/Throat:      Mouth: Mucous membranes are moist.      Pharynx: Oropharynx is clear. No oropharyngeal exudate or posterior oropharyngeal erythema.   Eyes:      General:         Right eye: No discharge.         Left eye: No discharge.      Conjunctiva/sclera: Conjunctivae normal.      Pupils: Pupils are equal, round, and reactive to light.   Cardiovascular:      Rate and Rhythm: Normal rate and regular rhythm.      Pulses: Normal pulses.      Heart sounds: Normal heart sounds. No murmur heard.     No friction rub. No gallop.   Pulmonary:      Effort: Pulmonary effort is normal. No respiratory distress.      Breath sounds: Normal breath sounds. No stridor. No wheezing, rhonchi or rales.   Chest:      Chest wall: No tenderness.   Abdominal:      General: Abdomen is flat. There is no distension.      Palpations: Abdomen is soft.      Tenderness: There is no abdominal tenderness. There is no right CVA tenderness, left CVA tenderness or guarding.   Musculoskeletal:         General: No swelling or deformity.      Cervical back: Normal range of motion and neck supple. No rigidity or tenderness.      Right lower leg: No edema.      Left lower leg: No edema.      Comments: Patient showed bilateral paraspinous muscle tenderness in lumbar region with midline tenderness overlying the lumbar vertebrae.  Patient noted stepping gait secondary to pain.   Lymphadenopathy:       Cervical: No cervical adenopathy.   Skin:     General: Skin is warm and dry.      Capillary Refill: Capillary refill takes less than 2 seconds.      Coloration: Skin is not jaundiced or pale.      Findings: No bruising, erythema, lesion or rash.   Neurological:      General: No focal deficit present.      Mental Status: He is alert and oriented to person, place, and time.      Cranial Nerves: No cranial nerve deficit.      Sensory: No sensory deficit.      Motor: No weakness.      Coordination: Coordination normal.      Gait: Gait normal.      Deep Tendon Reflexes: Reflexes normal.   Psychiatric:         Mood and Affect: Mood normal.         Results Reviewed       None            CT lumbar spine without contrast   Final Interpretation by Zaid Rollins MD (11/22 0110)      No evidence of lumbar spine fracture or disc herniation. Unremarkable exam.      Workstation performed: [id number]             Procedures    ED Medication and Procedure Management   Prior to Admission Medications   Prescriptions Last Dose Informant Patient Reported? Taking?   Lidocaine Viscous HCl (XYLOCAINE) 2 % mucosal solution   No No   Sig: Apply a small amount to affected area.   diazepam (VALIUM) 5 mg tablet   No No   Sig: Take 1 tablet (5 mg total) by mouth 2 (two) times a day for 10 days   ibuprofen (MOTRIN) 600 mg tablet   No No   Sig: Take 1 tablet (600 mg total) by mouth every 6 (six) hours as needed for mild pain for up to 10 days   levETIRAcetam (KEPPRA) 500 mg tablet   No No   Sig: Take 1 tablet (500 mg total) by mouth every 12 (twelve) hours   lidocaine (LIDODERM) 5 %   No No   Sig: Apply 1 patch topically every 24 hours Remove & Discard patch within 12 hours or as directed by MD      Facility-Administered Medications: None     Discharge Medication List as of 11/22/2024  1:49 AM        START taking these medications    Details   methocarbamol (ROBAXIN) 500 mg tablet Take 1 tablet (500 mg total) by mouth 2 (two) times a day,  Starting Fri 11/22/2024, Normal           CONTINUE these medications which have CHANGED    Details   lidocaine (LIDODERM) 5 % Apply 1 patch topically over 12 hours every 24 hours Remove & Discard patch within 12 hours or as directed by MD, Starting Fri 11/22/2024, Normal           CONTINUE these medications which have NOT CHANGED    Details   diazepam (VALIUM) 5 mg tablet Take 1 tablet (5 mg total) by mouth 2 (two) times a day for 10 days, Starting Mon 1/27/2020, Until Thu 2/6/2020, Normal      ibuprofen (MOTRIN) 600 mg tablet Take 1 tablet (600 mg total) by mouth every 6 (six) hours as needed for mild pain for up to 10 days, Starting Tue 1/24/2023, Until Fri 2/3/2023 at 2359, Normal      levETIRAcetam (KEPPRA) 500 mg tablet Take 1 tablet (500 mg total) by mouth every 12 (twelve) hours, Starting Sat 1/2/2021, Until Mon 2/1/2021, Normal      Lidocaine Viscous HCl (XYLOCAINE) 2 % mucosal solution Apply a small amount to affected area., Normal             ED SEPSIS DOCUMENTATION   Time reflects when diagnosis was documented in both MDM as applicable and the Disposition within this note       Time User Action Codes Description Comment    11/22/2024  1:48 AM Primitivo Merida Add [S39.012A] Strain of lumbar region, initial encounter                  Primitivo Merida PA-C  11/22/24 7443

## 2024-11-22 NOTE — DISCHARGE INSTRUCTIONS
Take medication as prescribed  Follow-up with comprehensive spine program within 1 week   Return to the emergency department for increasing fever, body aches, chills, chest pain, palpitation, shortness of breath, urinary bowel incontinence, weakness of the bilateral extremities, numbness in bilateral lower extremities, difficulty walking or ambulating.

## 2024-11-23 ENCOUNTER — APPOINTMENT (EMERGENCY)
Dept: MRI IMAGING | Facility: HOSPITAL | Age: 34
End: 2024-11-23
Payer: MEDICARE

## 2024-11-23 PROBLEM — F17.200 NICOTINE DEPENDENCE: Status: ACTIVE | Noted: 2024-11-23

## 2024-11-23 PROBLEM — M54.50 LUMBAGO: Status: ACTIVE | Noted: 2024-11-23

## 2024-11-23 PROBLEM — E55.9 VITAMIN D DEFICIENCY: Status: ACTIVE | Noted: 2024-11-23

## 2024-11-23 PROBLEM — G40.909 SEIZURE DISORDER (HCC): Status: ACTIVE | Noted: 2024-11-23

## 2024-11-23 LAB
ANION GAP SERPL CALCULATED.3IONS-SCNC: 5 MMOL/L (ref 4–13)
BASOPHILS # BLD AUTO: 0.03 THOUSANDS/ÂΜL (ref 0–0.1)
BASOPHILS NFR BLD AUTO: 0 % (ref 0–1)
BUN SERPL-MCNC: 19 MG/DL (ref 5–25)
CALCIUM SERPL-MCNC: 9.5 MG/DL (ref 8.4–10.2)
CHLORIDE SERPL-SCNC: 103 MMOL/L (ref 96–108)
CO2 SERPL-SCNC: 27 MMOL/L (ref 21–32)
CREAT SERPL-MCNC: 0.76 MG/DL (ref 0.6–1.3)
EOSINOPHIL # BLD AUTO: 0.19 THOUSAND/ÂΜL (ref 0–0.61)
EOSINOPHIL NFR BLD AUTO: 2 % (ref 0–6)
ERYTHROCYTE [DISTWIDTH] IN BLOOD BY AUTOMATED COUNT: 14.8 % (ref 11.6–15.1)
EST. AVERAGE GLUCOSE BLD GHB EST-MCNC: 123 MG/DL
GFR SERPL CREATININE-BSD FRML MDRD: 117 ML/MIN/1.73SQ M
GLUCOSE SERPL-MCNC: 89 MG/DL (ref 65–140)
HBA1C MFR BLD: 5.9 %
HCT VFR BLD AUTO: 44.6 % (ref 36.5–49.3)
HGB BLD-MCNC: 14.4 G/DL (ref 12–17)
IMM GRANULOCYTES # BLD AUTO: 0.02 THOUSAND/UL (ref 0–0.2)
IMM GRANULOCYTES NFR BLD AUTO: 0 % (ref 0–2)
LYMPHOCYTES # BLD AUTO: 2.55 THOUSANDS/ÂΜL (ref 0.6–4.47)
LYMPHOCYTES NFR BLD AUTO: 31 % (ref 14–44)
MCH RBC QN AUTO: 26.8 PG (ref 26.8–34.3)
MCHC RBC AUTO-ENTMCNC: 32.3 G/DL (ref 31.4–37.4)
MCV RBC AUTO: 83 FL (ref 82–98)
MONOCYTES # BLD AUTO: 0.55 THOUSAND/ÂΜL (ref 0.17–1.22)
MONOCYTES NFR BLD AUTO: 7 % (ref 4–12)
NEUTROPHILS # BLD AUTO: 4.96 THOUSANDS/ÂΜL (ref 1.85–7.62)
NEUTS SEG NFR BLD AUTO: 60 % (ref 43–75)
NRBC BLD AUTO-RTO: 0 /100 WBCS
PLATELET # BLD AUTO: 233 THOUSANDS/UL (ref 149–390)
PMV BLD AUTO: 10.1 FL (ref 8.9–12.7)
POTASSIUM SERPL-SCNC: 3.9 MMOL/L (ref 3.5–5.3)
RBC # BLD AUTO: 5.37 MILLION/UL (ref 3.88–5.62)
SODIUM SERPL-SCNC: 135 MMOL/L (ref 135–147)
WBC # BLD AUTO: 8.3 THOUSAND/UL (ref 4.31–10.16)

## 2024-11-23 PROCEDURE — 99223 1ST HOSP IP/OBS HIGH 75: CPT | Performed by: INTERNAL MEDICINE

## 2024-11-23 PROCEDURE — 85025 COMPLETE CBC W/AUTO DIFF WBC: CPT

## 2024-11-23 PROCEDURE — 83036 HEMOGLOBIN GLYCOSYLATED A1C: CPT | Performed by: INTERNAL MEDICINE

## 2024-11-23 PROCEDURE — 96372 THER/PROPH/DIAG INJ SC/IM: CPT

## 2024-11-23 PROCEDURE — 96374 THER/PROPH/DIAG INJ IV PUSH: CPT

## 2024-11-23 PROCEDURE — 36415 COLL VENOUS BLD VENIPUNCTURE: CPT

## 2024-11-23 PROCEDURE — 80048 BASIC METABOLIC PNL TOTAL CA: CPT

## 2024-11-23 RX ORDER — LIDOCAINE 50 MG/G
1 PATCH TOPICAL EVERY 24 HOURS
Status: DISCONTINUED | OUTPATIENT
Start: 2024-11-23 | End: 2024-11-24 | Stop reason: HOSPADM

## 2024-11-23 RX ORDER — CYCLOBENZAPRINE HCL 10 MG
10 TABLET ORAL 2 TIMES DAILY PRN
Qty: 20 TABLET | Refills: 0 | Status: SHIPPED | OUTPATIENT
Start: 2024-11-23 | End: 2024-11-24

## 2024-11-23 RX ORDER — ACETAMINOPHEN 325 MG/1
650 TABLET ORAL EVERY 6 HOURS PRN
Status: DISCONTINUED | OUTPATIENT
Start: 2024-11-23 | End: 2024-11-24 | Stop reason: HOSPADM

## 2024-11-23 RX ORDER — OXYCODONE HYDROCHLORIDE 5 MG/1
5 TABLET ORAL ONCE
Refills: 0 | Status: COMPLETED | OUTPATIENT
Start: 2024-11-23 | End: 2024-11-23

## 2024-11-23 RX ORDER — PANTOPRAZOLE SODIUM 40 MG/1
40 TABLET, DELAYED RELEASE ORAL
Status: DISCONTINUED | OUTPATIENT
Start: 2024-11-23 | End: 2024-11-24

## 2024-11-23 RX ORDER — IBUPROFEN 600 MG/1
600 TABLET, FILM COATED ORAL EVERY 6 HOURS SCHEDULED
Status: DISCONTINUED | OUTPATIENT
Start: 2024-11-25 | End: 2024-11-24 | Stop reason: HOSPADM

## 2024-11-23 RX ORDER — AMOXICILLIN 250 MG
1 CAPSULE ORAL
Status: DISCONTINUED | OUTPATIENT
Start: 2024-11-23 | End: 2024-11-24 | Stop reason: HOSPADM

## 2024-11-23 RX ORDER — KETOROLAC TROMETHAMINE 30 MG/ML
15 INJECTION, SOLUTION INTRAMUSCULAR; INTRAVENOUS ONCE
Status: COMPLETED | OUTPATIENT
Start: 2024-11-23 | End: 2024-11-23

## 2024-11-23 RX ORDER — HYDROMORPHONE HCL/PF 1 MG/ML
0.5 SYRINGE (ML) INJECTION ONCE
Status: COMPLETED | OUTPATIENT
Start: 2024-11-23 | End: 2024-11-23

## 2024-11-23 RX ORDER — KETOROLAC TROMETHAMINE 30 MG/ML
15 INJECTION, SOLUTION INTRAMUSCULAR; INTRAVENOUS EVERY 6 HOURS SCHEDULED
Status: DISCONTINUED | OUTPATIENT
Start: 2024-11-23 | End: 2024-11-24 | Stop reason: HOSPADM

## 2024-11-23 RX ORDER — LIDOCAINE 50 MG/G
1 PATCH TOPICAL ONCE
Status: COMPLETED | OUTPATIENT
Start: 2024-11-23 | End: 2024-11-23

## 2024-11-23 RX ORDER — ACETAMINOPHEN 325 MG/1
975 TABLET ORAL EVERY 8 HOURS SCHEDULED
Status: DISCONTINUED | OUTPATIENT
Start: 2024-11-23 | End: 2024-11-24 | Stop reason: HOSPADM

## 2024-11-23 RX ORDER — METHOCARBAMOL 500 MG/1
500 TABLET, FILM COATED ORAL EVERY 6 HOURS SCHEDULED
Status: DISCONTINUED | OUTPATIENT
Start: 2024-11-23 | End: 2024-11-24 | Stop reason: HOSPADM

## 2024-11-23 RX ORDER — MUSCLE RUB CREAM 100; 150 MG/G; MG/G
1 CREAM TOPICAL 3 TIMES DAILY
Status: DISCONTINUED | OUTPATIENT
Start: 2024-11-23 | End: 2024-11-24 | Stop reason: HOSPADM

## 2024-11-23 RX ORDER — OXYCODONE HYDROCHLORIDE 5 MG/1
5 TABLET ORAL EVERY 4 HOURS PRN
Refills: 0 | Status: DISCONTINUED | OUTPATIENT
Start: 2024-11-23 | End: 2024-11-24 | Stop reason: HOSPADM

## 2024-11-23 RX ORDER — ERGOCALCIFEROL 1.25 MG/1
50000 CAPSULE, LIQUID FILLED ORAL WEEKLY
Status: DISCONTINUED | OUTPATIENT
Start: 2024-11-23 | End: 2024-11-24 | Stop reason: HOSPADM

## 2024-11-23 RX ORDER — GABAPENTIN 100 MG/1
100 CAPSULE ORAL 3 TIMES DAILY
Status: DISCONTINUED | OUTPATIENT
Start: 2024-11-23 | End: 2024-11-24 | Stop reason: HOSPADM

## 2024-11-23 RX ORDER — ONDANSETRON 2 MG/ML
4 INJECTION INTRAMUSCULAR; INTRAVENOUS EVERY 4 HOURS PRN
Status: DISCONTINUED | OUTPATIENT
Start: 2024-11-23 | End: 2024-11-24 | Stop reason: HOSPADM

## 2024-11-23 RX ORDER — METHYLPREDNISOLONE 4 MG/1
TABLET ORAL
Qty: 21 TABLET | Refills: 0 | Status: SHIPPED | OUTPATIENT
Start: 2024-11-23 | End: 2024-11-24

## 2024-11-23 RX ORDER — DIAZEPAM 5 MG/1
5 TABLET ORAL ONCE
Status: COMPLETED | OUTPATIENT
Start: 2024-11-23 | End: 2024-11-23

## 2024-11-23 RX ORDER — METHOCARBAMOL 750 MG/1
750 TABLET, FILM COATED ORAL EVERY 6 HOURS SCHEDULED
Status: DISCONTINUED | OUTPATIENT
Start: 2024-11-23 | End: 2024-11-23

## 2024-11-23 RX ORDER — LIDOCAINE 50 MG/G
1 PATCH TOPICAL DAILY
Qty: 7 PATCH | Refills: 0 | Status: SHIPPED | OUTPATIENT
Start: 2024-11-23

## 2024-11-23 RX ORDER — NAPROXEN 500 MG/1
500 TABLET ORAL 2 TIMES DAILY WITH MEALS
Qty: 14 TABLET | Refills: 0 | Status: SHIPPED | OUTPATIENT
Start: 2024-11-23 | End: 2024-11-24

## 2024-11-23 RX ORDER — LEVETIRACETAM 500 MG/1
500 TABLET ORAL EVERY 12 HOURS SCHEDULED
Status: DISCONTINUED | OUTPATIENT
Start: 2024-11-23 | End: 2024-11-24 | Stop reason: HOSPADM

## 2024-11-23 RX ADMIN — NICOTINE 7 MG/24 HR DAILY TRANSDERMAL PATCH 1 PATCH: at 15:02

## 2024-11-23 RX ADMIN — KETOROLAC TROMETHAMINE 15 MG: 30 INJECTION, SOLUTION INTRAMUSCULAR; INTRAVENOUS at 11:10

## 2024-11-23 RX ADMIN — KETOROLAC TROMETHAMINE 15 MG: 30 INJECTION, SOLUTION INTRAMUSCULAR; INTRAVENOUS at 17:24

## 2024-11-23 RX ADMIN — MENTHOL 10%, METHYL SALICYLATE 15% 1 APPLICATION: 10; 15 CREAM TOPICAL at 12:15

## 2024-11-23 RX ADMIN — ACETAMINOPHEN 975 MG: 325 TABLET, FILM COATED ORAL at 12:13

## 2024-11-23 RX ADMIN — OXYCODONE HYDROCHLORIDE 5 MG: 5 TABLET ORAL at 01:11

## 2024-11-23 RX ADMIN — GABAPENTIN 100 MG: 100 CAPSULE ORAL at 17:24

## 2024-11-23 RX ADMIN — GABAPENTIN 100 MG: 100 CAPSULE ORAL at 21:31

## 2024-11-23 RX ADMIN — LEVETIRACETAM 500 MG: 500 TABLET, FILM COATED ORAL at 08:04

## 2024-11-23 RX ADMIN — LIDOCAINE 1 PATCH: 50 PATCH TOPICAL at 00:16

## 2024-11-23 RX ADMIN — MENTHOL 10%, METHYL SALICYLATE 15% 1 APPLICATION: 10; 15 CREAM TOPICAL at 17:24

## 2024-11-23 RX ADMIN — ACETAMINOPHEN 975 MG: 325 TABLET, FILM COATED ORAL at 21:31

## 2024-11-23 RX ADMIN — LEVETIRACETAM 500 MG: 500 TABLET, FILM COATED ORAL at 21:31

## 2024-11-23 RX ADMIN — GABAPENTIN 100 MG: 100 CAPSULE ORAL at 12:14

## 2024-11-23 RX ADMIN — PANTOPRAZOLE SODIUM 40 MG: 40 TABLET, DELAYED RELEASE ORAL at 17:24

## 2024-11-23 RX ADMIN — HYDROMORPHONE HYDROCHLORIDE 0.5 MG: 1 INJECTION, SOLUTION INTRAMUSCULAR; INTRAVENOUS; SUBCUTANEOUS at 02:25

## 2024-11-23 RX ADMIN — KETOROLAC TROMETHAMINE 15 MG: 30 INJECTION, SOLUTION INTRAMUSCULAR; INTRAVENOUS at 00:16

## 2024-11-23 RX ADMIN — METHOCARBAMOL 500 MG: 750 TABLET ORAL at 12:14

## 2024-11-23 RX ADMIN — DIAZEPAM 5 MG: 5 TABLET ORAL at 00:16

## 2024-11-23 RX ADMIN — METHOCARBAMOL 500 MG: 750 TABLET ORAL at 17:24

## 2024-11-23 RX ADMIN — ERGOCALCIFEROL CAPSULES, 50000 UNITS: 1.25 CAPSULE ORAL at 12:14

## 2024-11-23 NOTE — DISCHARGE INSTRUCTIONS
While taking the steroids, do naot take ibuprofen or naproxen. After the course of steroids, you can take those medications for pain.      You were evaluated in the Emergency Department today for your back pain. Your evaluation did not show signs of medical conditions requiring emergent intervention at this time, and we feel safe discharging you home.    I gave you a prescription for naproxen and lidoderm patches. These are at your pharmacy. Please take as prescribed. You can also take tylenol for pain. This medication is over the counter.    Please schedule an appointment for follow-up with your primary care physician this week for further evaluation of your symptoms. I also wrote a referral for the comprehensive spine program. Please follow up with them.    Return to the Emergency Department if you experience worsening back pain, difficulty walking, fevers, numbness, tingling, difficulty urinating, incontinence, or any other concerning symptoms.    Thank you for choosing us for your care.

## 2024-11-23 NOTE — ED PROVIDER NOTES
Time reflects when diagnosis was documented in both MDM as applicable and the Disposition within this note       Time User Action Codes Description Comment    11/23/2024  2:22 AM Walter Keene Add [M54.50] Low back pain           ED Disposition       ED Disposition   Discharge    Condition   Stable    Date/Time   Sat Nov 23, 2024  2:23 AM    Comment   Anabell Ray discharge to home/self care.                   Assessment & Plan       Medical Decision Making  Patient with history as below presented with low back pain. History obtained from patient.    Differential diagnosis includes: Muscle strain, muscle spasm, sciatica    Plan: Toradol, Lidoderm, Valium    Patient treated with below with persistence of symptoms.  Continue to suspect that this is muscular strain versus spasm in nature, however he seems to have exhausted medication options and continues to refuse to ambulate for me.  Will give dose of opioid and reassess.  Postvoid residual performed by nursing normal and reassuring.    Despite opioid medication, patient continues to have poor ability to ambulate.  I do suspect that this is predominantly due to pain.  His clinical exam does not suggest weakness, however he is very noncooperative for the exam.  Will broaden differential to include cauda equina, however I think that this is very unlikely.  Discussed with radiology for approval for MRI which will be performed in the morning.  Will keep the patient under observation until that point in time.  Patient made aware of this and under agreement.  Will send for labs and give additional opioid in the meantime.  Signed out the next shift pending imaging results.    Amount and/or Complexity of Data Reviewed  Labs: ordered.  Radiology: ordered.    Risk  Prescription drug management.        ED Course as of 11/23/24 0935   Sat Nov 23, 2024   0131 PVR 42 as per nursing.   0210 Dr. Yovani Sawyer approves MRI lumbar spine in AM       Medications   lidocaine  (LIDODERM) 5 % patch 1 patch (1 patch Topical Medication Applied 11/23/24 0016)   levETIRAcetam (KEPPRA) tablet 500 mg (500 mg Oral Given 11/23/24 0804)   ketorolac (TORADOL) injection 15 mg (15 mg Intramuscular Given 11/23/24 0016)   diazepam (VALIUM) tablet 5 mg (5 mg Oral Given 11/23/24 0016)   oxyCODONE (ROXICODONE) IR tablet 5 mg (5 mg Oral Given 11/23/24 0111)   HYDROmorphone (DILAUDID) injection 0.5 mg (0.5 mg Intravenous Given 11/23/24 0225)       ED Risk Strat Scores                           SBIRT 22yo+      Flowsheet Row Most Recent Value   Initial Alcohol Screen: US AUDIT-C     1. How often do you have a drink containing alcohol? 1 Filed at: 11/23/2024 0303   2. How many drinks containing alcohol do you have on a typical day you are drinking?  1 Filed at: 11/23/2024 0303   3a. Male UNDER 65: How often do you have five or more drinks on one occasion? 0 Filed at: 11/23/2024 0303   Audit-C Score 2 Filed at: 11/23/2024 0303   JUAN A: How many times in the past year have you...    Used an illegal drug or used a prescription medication for non-medical reasons? Never Filed at: 11/23/2024 0303                            History of Present Illness       Chief Complaint   Patient presents with    Back Pain     Pt arrived via EMS experiencing back pain that radiates into left leg. Hx of sciatica, pt was treated yesterday for same cc. States pain feels about the same as yesterday, and feels like he is unable to ambulate. Denies dizziness, cp, sob, n/v/d.        Past Medical History:   Diagnosis Date    Hypertension     Psychiatric disorder     Seizures (HCC)       History reviewed. No pertinent surgical history.   History reviewed. No pertinent family history.   Social History     Tobacco Use    Smoking status: Some Days    Smokeless tobacco: Never   Vaping Use    Vaping status: Some Days    Substances: Nicotine   Substance Use Topics    Alcohol use: Yes     Comment: socially     Drug use: Not Currently       E-Cigarette/Vaping    E-Cigarette Use Current Some Day User       E-Cigarette/Vaping Substances    Nicotine Yes       I have reviewed and agree with the history as documented.     Patient is a 34-year-old male presenting for evaluation of low back pain.  Patient reports that he has had a similar back pain in the past however this seems to be more persistent than previous.  He was evaluated in our emergency department yesterday secondary to this pain and had negative CT imaging.  Patient reports that this pain started approximately 2 to 3 days ago after thinking that he slept wrong in bed.  His sharp pain that is predominantly in his low back.  Is worsened by motion and relieved by rest.  He denies any urinary retention or incontinence.  He denies any numbness or saddle anesthesia.  He denies fevers.  He denies history of drug use.  He denies any chronic steroid use.  He denies procedures on his back in the past.        Review of Systems   Musculoskeletal:  Positive for back pain.   Neurological:  Negative for weakness and numbness.           Objective       ED Triage Vitals   Temperature Pulse Blood Pressure Respirations SpO2 Patient Position - Orthostatic VS   11/22/24 2336 11/22/24 2336 11/22/24 2336 11/22/24 2336 11/22/24 2336 11/22/24 2336   98.9 °F (37.2 °C) 82 141/97 18 98 % Lying      Temp Source Heart Rate Source BP Location FiO2 (%) Pain Score    11/22/24 2336 11/22/24 2336 11/22/24 2336 -- 11/23/24 0016    Oral Monitor Right arm  10 - Worst Possible Pain      Vitals      Date and Time Temp Pulse SpO2 Resp BP Pain Score FACES Pain Rating User   11/23/24 0800 -- 61 96 % 18 125/69 -- --    11/23/24 0700 -- 67 97 % 18 136/69 -- --    11/23/24 0600 -- 63 97 % 18 119/59 -- --    11/23/24 0500 -- 61 96 % 18 125/64 -- --    11/23/24 0400 -- 64 98 % 18 124/70 -- --    11/23/24 0244 -- 86 99 % 18 147/85 -- --    11/23/24 0225 -- -- -- -- -- 9 --    11/23/24 0111 -- -- -- -- -- 8 --    11/23/24 0016  -- -- -- -- -- 10 - Worst Possible Pain --    11/22/24 2336 98.9 °F (37.2 °C) 82 98 % 18 141/97 -- --             Physical Exam  Vitals and nursing note reviewed.   Constitutional:       General: He is not in acute distress.     Appearance: Normal appearance. He is not ill-appearing or toxic-appearing.   HENT:      Head: Normocephalic and atraumatic.      Right Ear: External ear normal.      Left Ear: External ear normal.      Nose: Nose normal.   Eyes:      General: No scleral icterus.        Right eye: No discharge.         Left eye: No discharge.      Extraocular Movements: Extraocular movements intact.      Conjunctiva/sclera: Conjunctivae normal.   Cardiovascular:      Rate and Rhythm: Normal rate.      Heart sounds: Normal heart sounds. No murmur heard.     No friction rub. No gallop.   Pulmonary:      Effort: Pulmonary effort is normal. No respiratory distress.      Breath sounds: Normal breath sounds.   Abdominal:      General: Abdomen is flat. There is no distension.      Palpations: Abdomen is soft. There is no mass.      Tenderness: There is no abdominal tenderness.   Genitourinary:     Comments: Deferred  Musculoskeletal:      Comments: Diffuse tenderness to palpation of the low back.  No step-offs or deformities.  Patient poorly cooperative with lifting his legs off the bed, however is able to move his bilateral lower extremities in flexion and extension without issue.  When prompted he does seem to have full strength of the lower extremities however he does seem to have pain limiting his willingness to participate on exam.  No changes in sensation or sensation deficit.  Normal patellar and Achilles reflexes.  DP/PT pulses 2+/4.   Skin:     General: Skin is warm and dry.   Neurological:      General: No focal deficit present.      Mental Status: He is alert.         Results Reviewed       Procedure Component Value Units Date/Time    Basic metabolic panel [[id number]] Collected: 11/23/24 0225    Lab  Status: Final result Specimen: Blood from Arm, Left Updated: 11/23/24 0303     Sodium 135 mmol/L      Potassium 3.9 mmol/L      Chloride 103 mmol/L      CO2 27 mmol/L      ANION GAP 5 mmol/L      BUN 19 mg/dL      Creatinine 0.76 mg/dL      Glucose 89 mg/dL      Calcium 9.5 mg/dL      eGFR 117 ml/min/1.73sq m     Narrative:      National Kid[employer] Foundation guidelines for Chronic Kidney Disease (CKD):     Stage 1 with normal or high GFR (GFR > 90 mL/min/1.73 square meters)    Stage 2 Mild CKD (GFR = 60-89 mL/min/1.73 square meters)    Stage 3A Moderate CKD (GFR = 45-59 mL/min/1.73 square meters)    Stage 3B Moderate CKD (GFR = 30-44 mL/min/1.73 square meters)    Stage 4 Severe CKD (GFR = 15-29 mL/min/1.73 square meters)    Stage 5 End Stage CKD (GFR <15 mL/min/1.73 square meters)  Note: GFR calculation is accurate only with a steady state creatinine    CBC and differential [[id number]] Collected: 11/23/24 0225    Lab Status: Final result Specimen: Blood from Arm, Left Updated: 11/23/24 0241     WBC 8.30 Thousand/uL      RBC 5.37 Million/uL      Hemoglobin 14.4 g/dL      Hematocrit 44.6 %      MCV 83 fL      MCH 26.8 pg      MCHC 32.3 g/dL      RDW 14.8 %      MPV 10.1 fL      Platelets 233 Thousands/uL      nRBC 0 /100 WBCs      Segmented % 60 %      Immature Grans % 0 %      Lymphocytes % 31 %      Monocytes % 7 %      Eosinophils Relative 2 %      Basophils Relative 0 %      Absolute Neutrophils 4.96 Thousands/µL      Absolute Immature Grans 0.02 Thousand/uL      Absolute Lymphocytes 2.55 Thousands/µL      Absolute Monocytes 0.55 Thousand/µL      Eosinophils Absolute 0.19 Thousand/µL      Basophils Absolute 0.03 Thousands/µL             MRI lumbar spine wo contrast    (Results Pending)       Procedures    ED Medication and Procedure Management   Prior to Admission Medications   Prescriptions Last Dose Informant Patient Reported? Taking?   Lidocaine Viscous HCl (XYLOCAINE) 2 % mucosal solution   No Yes   Sig:  Apply a small amount to affected area.   diazepam (VALIUM) 5 mg tablet   No No   Sig: Take 1 tablet (5 mg total) by mouth 2 (two) times a day for 10 days   ibuprofen (MOTRIN) 600 mg tablet   No No   Sig: Take 1 tablet (600 mg total) by mouth every 6 (six) hours as needed for mild pain for up to 10 days   levETIRAcetam (KEPPRA) 500 mg tablet   No No   Sig: Take 1 tablet (500 mg total) by mouth every 12 (twelve) hours   lidocaine (LIDODERM) 5 %   No Yes   Sig: Apply 1 patch topically over 12 hours every 24 hours Remove & Discard patch within 12 hours or as directed by MD   methocarbamol (ROBAXIN) 500 mg tablet   No Yes   Sig: Take 1 tablet (500 mg total) by mouth 2 (two) times a day      Facility-Administered Medications: None     Patient's Medications   Discharge Prescriptions    LIDOCAINE (LIDODERM) 5 %    Apply 1 patch topically over 12 hours daily Remove & Discard patch within 12 hours or as directed by MD       Start Date: 11/23/2024End Date: --       Order Dose: 1 patch       Quantity: 7 patch    Refills: 0    NAPROXEN (NAPROSYN) 500 MG TABLET    Take 1 tablet (500 mg total) by mouth 2 (two) times a day with meals for 7 days       Start Date: 11/23/2024End Date: 11/30/2024       Order Dose: 500 mg       Quantity: 14 tablet    Refills: 0       ED SEPSIS DOCUMENTATION   Time reflects when diagnosis was documented in both MDM as applicable and the Disposition within this note       Time User Action Codes Description Comment    11/23/2024  2:22 AM Walter Keene [M54.50] Low back pain                  Walter Keene,   11/23/24 0935

## 2024-11-23 NOTE — ED NOTES
Pt's fiance (Loly) called for an update. RN verified with pt that this writer was able to relinquish information and reviewed status with Loly.      Gertrude Rodrigez RN  11/23/24 3280

## 2024-11-23 NOTE — ED NOTES
Pt denies dizziness, head strike, states lying too long and needs to give his legs time to re coup     Gertrude Rodrigez RN  11/23/24 2553

## 2024-11-23 NOTE — ED NOTES
"Pt can be heard yelling \"help\" from room. RN entered room to find pt on the floor with his pants around his ankles. RN attempted to help pt to sitting with assistance of student (Sudha) and pt began to yell with increased force stating \"it hurts too much! You guys need to figure this out.\" And states he can not be moved.     Gertrude Rodrigez RN  11/23/24 1021    "

## 2024-11-23 NOTE — ASSESSMENT & PLAN NOTE
Right leg pain and sciatica in the setting of recent lifting injury  Denies any trauma, reports sharp shooting electrical pains down the back of his leg.  History of previous musculoskeletal back injuries in the past  No focal neurologic deficits to suggest cauda equina syndrome  Deep tendon reflexes are brisk  CT scan performed 11/22/2024 shows no fracture  MRI ordered by the emergency room however due to claustrophobia unable to complete  Will start scheduled nonsteroidal anti-inflammatory  Given family history of diabetes we will check A1c.  Consider corticosteroids if no improvement  Continue muscle relaxer scheduled  Continue heating pad and lidocaine patch as well as Bengay  Physical therapy and Occupational Therapy consultation      Discharge regimen:  Robaxin 500 mg every 6-8 hours as needed for muscle spasm  Ketorolac tablets-for the next 5 days  Bengay or IcyHot topically  Lidocaine patch as needed  Gabapentin 100 mg 3 times a day for the next 5 to 7 days    Outpatient follow-up with PT OT

## 2024-11-23 NOTE — H&P
H&P - Hospitalist   Name: Anabell Ray 34 y.o. male I MRN: [id number]  Unit/Bed#: ED-08 I Date of Admission: 11/22/2024   Date of Service: 11/23/2024 I Hospital Day: 0       Assessment and Plan  * Lumbago  Assessment & Plan  Left leg pain and sciatica in the setting of recent lifting injury  Denies any trauma, reports sharp shooting electrical pains down the back of his leg.  History of previous musculoskeletal back injuries in the past  No focal neurologic deficits to suggest cauda equina syndrome  Deep tendon reflexes are brisk  CT scan performed 11/22/2024 shows no fracture  MRI ordered by the emergency room however due to claustrophobia unable to complete  Will start scheduled nonsteroidal anti-inflammatory  Given family history of diabetes we will check A1c.  Consider corticosteroids if no improvement  Continue muscle relaxer scheduled  Continue heating pad and lidocaine patch as well as Bengay  Physical therapy and Occupational Therapy consultation      Start scheduled NSAIDs, scheduled muscle relaxer  PT OT consultation  PPI while on nonsteroidal anti-inflammatory  Topical pain relief  If no improvement could consider corticosteroid use    Vitamin D deficiency  Assessment & Plan  Will start on vitamin D 50,000 units once weekly for 12 weeks  Vitamin D level on 3/2024 was 16    Nicotine dependence  Assessment & Plan  Smokes 4 cigarettes/day  Start nicotine patch 7 mcg    Seizure disorder (HCC)  Assessment & Plan  Continue Keppra        Code Status: Full code    VTE Prophylaxis:  Considered low risk, early ambulation       Discussion with family: Patient updating family, fiancé updated by nursing    Anticipated Length of Stay:  Patient will be admitted on an Observation basis with an anticipated length of stay of less than 2 midnights.   Justification for Hospital Stay: Lumbago     Total Time for Visit, including Counseling / Coordination of Care: 76 minutes.  Greater than 50% of this total time spent on  direct patient counseling and coordination of care.    Chief Complaint:     Chief Complaint   Patient presents with    Back Pain     Pt arrived via EMS experiencing back pain that radiates into left leg. Hx of sciatica, pt was treated yesterday for same cc. States pain feels about the same as yesterday, and feels like he is unable to ambulate. Denies dizziness, cp, sob, n/v/d.        History of Present Illness:    Anabell Ray is a 34 y.o. male who presents with back pain.  The patient has a past medical history of seizure disorder, he also has previous history of lumbago.  The patient does work as a [employer]  and does have significant lifting responsibilities.  He was seen in the emergency room 1 day prior, at that time CT imaging demonstrated no acute fracture.  He now returns back to the hospital with worsening back pain.  Denies any saddle anesthesia, no loss of bowel or bladder control.  The patient was scheduled for discharge from the emergency room however due to pain as well as weakness in the leg and inability to ambulate discharge was held.  Patient does smoke cigarettes, he does drink socially but not to excess.  He reports family history of diabetes mellitus.  He denies any recent trauma.  He describes his back pain is located in his left buttocks and pelvis with radiation down the leg.  He describes it as a sharp shooting electrical pain    Review of Systems:    A complete and comprehensive 14 point organ system review was performed and all other systems are negative other than stated above in the HPI    Past Medical and Surgical History:     Past Medical History:   Diagnosis Date    Hypertension     Psychiatric disorder     Seizures (HCC)        History reviewed. No pertinent surgical history.    Meds/Allergies:    Prior to Admission medications    Medication Sig Start Date End Date Taking? Authorizing Provider   cyclobenzaprine (FLEXERIL) 10 mg tablet Take 1 tablet (10 mg total) by mouth  2 (two) times a day as needed for muscle spasms for up to 20 doses 11/23/24  Yes Sukhi Escobar MD   lidocaine (LIDODERM) 5 % Apply 1 patch topically over 12 hours every 24 hours Remove & Discard patch within 12 hours or as directed by MD 11/22/24  Yes Primitivo Merida PA-C   lidocaine (Lidoderm) 5 % Apply 1 patch topically over 12 hours daily Remove & Discard patch within 12 hours or as directed by MD 11/23/24  Yes Walter Keene, DO   Lidocaine Viscous HCl (XYLOCAINE) 2 % mucosal solution Apply a small amount to affected area. 1/24/23  Yes Summer Phan PA-C   methylPREDNISolone 4 MG tablet therapy pack Use as directed on package 11/23/24  Yes Sukhi Escobar MD   naproxen (Naprosyn) 500 mg tablet Take 1 tablet (500 mg total) by mouth 2 (two) times a day with meals for 7 days 11/23/24 11/30/24 Yes Walter Keene,    methocarbamol (ROBAXIN) 500 mg tablet Take 1 tablet (500 mg total) by mouth 2 (two) times a day 11/22/24 11/23/24 Yes Primitivo Merida PA-C   diazepam (VALIUM) 5 mg tablet Take 1 tablet (5 mg total) by mouth 2 (two) times a day for 10 days 1/27/20 2/6/20  Catie Garcia, DO   ibuprofen (MOTRIN) 600 mg tablet Take 1 tablet (600 mg total) by mouth every 6 (six) hours as needed for mild pain for up to 10 days 1/24/23 2/3/23  Summer Phan PA-C   levETIRAcetam (KEPPRA) 500 mg tablet Take 1 tablet (500 mg total) by mouth every 12 (twelve) hours 1/2/21 2/1/21  FRANTZ Cramer     I have reviewed home medications with patient personally.    Allergies: No Known Allergies    Social History:     Marital Status: Single   Occupation:  at [employer]    Substance Use History:   Social History     Substance and Sexual Activity   Alcohol Use Yes    Comment: socially      Social History     Tobacco Use   Smoking Status Some Days   Smokeless Tobacco Never     Social History     Substance and Sexual Activity   Drug Use Not Currently        Family History:    History reviewed. No pertinent family history.  Family history of diabetes mellitus    Physical Exam:     Vitals:   Blood Pressure: 137/79 (11/23/24 0950)  Pulse: 62 (11/23/24 0950)  Temperature: 98.9 °F (37.2 °C) (11/22/24 2336)  Temp Source: Oral (11/22/24 2336)  Respirations: 18 (11/23/24 0950)  SpO2: 100 % (11/23/24 0950)      General: well appearing, no acute distress  HEENT: atraumatic, PERRLA, moist mucosa, normal pharynx, normal tonsils and adenoids, normal tongue, no fluid in sinuses  Neck: Trachea midline, no carotid bruit, no masses  Respiratory: normal chest wall expansion, CTA B, no r/r/w, no rubs  Cardiovascular: RRR, no m/r/g, Normal S1 and S2  Abdomen: Soft, non-tender, non-distended, normal bowel sounds in all quadrants, no hepatosplenomegaly, no tympany  Rectal: deferred  Musculoskeletal: Tight paraspinal muscles, tenderness upon the left gluteal muscle.  Integumentary: warm, dry, and pink, with no rash, purpura, or petechia  Heme/Lymph: no lymphadenopathy, no bruises  Neurological: Cranial Nerves II-XII grossly intact, decreased strength of the right leg, tendon reflexes are brisk, graded 2 out of 4 of the lower extremities, no saddle anesthesia.  Muscle strength he shows left leg with 5 out of 5, right leg with 4 out of 5 muscle strength  Psychiatric: cooperative with normal mood, affect, and cognition    Additional Data:     Lab Results: Laboratory studies reviewed for today    Results from last 7 days   Lab Units 11/23/24 0225   WBC Thousand/uL 8.30   HEMOGLOBIN g/dL 14.4   HEMATOCRIT % 44.6   PLATELETS Thousands/uL 233   SEGS PCT % 60   LYMPHO PCT % 31   MONO PCT % 7   EOS PCT % 2     Results from last 7 days   Lab Units 11/23/24 0225   SODIUM mmol/L 135   POTASSIUM mmol/L 3.9   CHLORIDE mmol/L 103   CO2 mmol/L 27   BUN mg/dL 19   CREATININE mg/dL 0.76   ANION GAP mmol/L 5   CALCIUM mg/dL 9.5   GLUCOSE RANDOM mg/dL 89                           Imaging: Results Review  Statement: I personally reviewed the following image studies/reports in PACS and discussed pertinent findings with Radiology: CT CT lumbar spine. My interpretation of the radiology images/reports is: No acute fracture noted.    MRI lumbar spine wo contrast    (Results Pending)       EKG, Pathology, and Other Studies Reviewed on Admission:   Reviewed ER visit    Allscripts / Epic Records Reviewed: Yes    ** Please Note: This note was completed in part utilizing [employer] Dragon [employer] One Software.  Grammatical errors, random word insertions, spelling mistakes, and incomplete sentences may be an occasional consequence of this system secondary to software limitations, ambient noise, and hardware issues.  If you have any questions or concerns about the content, text, or information contained within the body of this dictation, please contact the provider for clarification.**

## 2024-11-23 NOTE — ED NOTES
Pt refused MRI due to claustrophobia. MRI offered to obtain medicine from RN but pt refused. Provider aware.      Gertrude Rodrigez RN  11/23/24 5185

## 2024-11-23 NOTE — PLAN OF CARE
Problem: PAIN - ADULT  Goal: Verbalizes/displays adequate comfort level or baseline comfort level  Description: Interventions:  - Encourage patient to monitor pain and request assistance  - Assess pain using appropriate pain scale  - Administer analgesics based on type and severity of pain and evaluate response  - Implement non-pharmacological measures as appropriate and evaluate response  - Consider cultural and social influences on pain and pain management  - Notify physician/advanced practitioner if interventions unsuccessful or patient reports new pain  Outcome: Progressing     Problem: INFECTION - ADULT  Goal: Absence or prevention of progression during hospitalization  Description: INTERVENTIONS:  - Assess and monitor for signs and symptoms of infection  - Monitor lab/diagnostic results  - Monitor all insertion sites, i.e. indwelling lines, tubes, and drains  - Monitor endotracheal if appropriate and nasal secretions for changes in amount and color  - [place] appropriate cooling/warming therapies per order  - Administer medications as ordered  - Instruct and encourage patient and family to use good hand hygiene technique  - Identify and instruct in appropriate isolation precautions for identified infection/condition  Outcome: Progressing  Goal: Absence of fever/infection during neutropenic period  Description: INTERVENTIONS:  - Monitor WBC    Outcome: Progressing     Problem: SAFETY ADULT  Goal: Patient will remain free of falls  Description: INTERVENTIONS:  - Educate patient/family on patient safety including physical limitations  - Instruct patient to call for assistance with activity   - Consult OT/PT to assist with strengthening/mobility   - Keep Call bell within reach  - Keep bed low and locked with side rails adjusted as appropriate  - Keep care items and personal belongings within reach  - Initiate and maintain comfort rounds  - Make Fall Risk Sign visible to staff  - Offer Toileting every 2 Hours,  in advance of need  - Initiate/Maintain bed alarm  - Obtain necessary fall risk management equipment:   - Apply yellow socks and bracelet for high fall risk patients  - Consider moving patient to room near nurses station  Outcome: Progressing  Goal: Maintain or return to baseline ADL function  Description: INTERVENTIONS:  -  Assess patient's ability to carry out ADLs; assess patient's baseline for ADL function and identify physical deficits which impact ability to perform ADLs (bathing, care of mouth/teeth, toileting, grooming, dressing, etc.)  - Assess/evaluate cause of self-care deficits   - Assess range of motion  - Assess patient's mobility; develop plan if impaired  - Assess patient's need for assistive devices and provide as appropriate  - Encourage maximum independence but intervene and supervise when necessary  - Involve family in performance of ADLs  - Assess for home care needs following discharge   - Consider OT consult to assist with ADL evaluation and planning for discharge  - Provide patient education as appropriate  Outcome: Progressing  Goal: Maintains/Returns to pre admission functional level  Description: INTERVENTIONS:  - Perform AM-PAC 6 Click Basic Mobility/ Daily Activity assessment daily.  - Set and communicate daily mobility goal to care team and patient/family/caregiver.   - Collaborate with rehabilitation services on mobility goals if consulted  - Perform Range of Motion 4 times a day.  - Reposition patient every 2 hours.  - Dangle patient 3 times a day  - Stand patient 3 times a day  - Ambulate patient 3 times a day  - Out of bed to chair 3 times a day   - Out of bed for meals 3 times a day  - Out of bed for toileting  - Record patient progress and toleration of activity level   Outcome: Progressing     Problem: DISCHARGE PLANNING  Goal: Discharge to home or other facility with appropriate resources  Description: INTERVENTIONS:  - Identify barriers to discharge w/patient and caregiver  -  Arrange for needed discharge resources and transportation as appropriate  - Identify discharge learning needs (meds, wound care, etc.)  - Arrange for interpretive services to assist at discharge as needed  - Refer to Case Management Department for coordinating discharge planning if the patient needs post-hospital services based on physician/advanced practitioner order or complex needs related to functional status, cognitive ability, or social support system  Outcome: Progressing     Problem: Knowledge Deficit  Goal: Patient/family/caregiver demonstrates understanding of disease process, treatment plan, medications, and discharge instructions  Description: Complete learning assessment and assess knowledge base.  Interventions:  - Provide teaching at level of understanding  - Provide teaching via preferred learning methods  Outcome: Progressing

## 2024-11-23 NOTE — ED CARE HANDOFF
Emergency Department Sign Out Note        Sign out and transfer of care from Dr. Keene. See Separate Emergency Department note.     The patient, Anabell Ray, was evaluated by the previous provider for low back pain.    Workup Completed:  Patient had a CT of the lower back which did not show any abnormality.  MRI attempted but the patient could not tolerate it.  Claustrophobia.    ED Course / Workup Pending (followup):  I discussed with the patient about follow-up and change in medication regimen and when he tried to get up he fell onto the ground.  He did not hurt himself.  I helped him put on his back brace and try and get up and he cannot ambulate.  I discussed with him that we will need to admit him to the hospital to get his back pain under better control he may need to be seen by PT and the possibility of completing an MRI.  He has no fever to suggest an infection.  He is moving his legs and has no bowel or bladder complaints at the moment.  Discussed with internal medicine regarding admission.                                  ED Course as of 11/23/24 1044   Sat Nov 23, 2024   0949 Patient could not tolerate the MRI secondary to claustrophobia.  Per report from Dr. Keene he was neurovascularly intact.  I discussed at length with him the follow-up and we will try a different regimen.   1000 Patient able to sit up under his own power and get himself dressed.     Procedures  Medical Decision Making          Disposition  Final diagnoses:   Low back pain     Time reflects when diagnosis was documented in both MDM as applicable and the Disposition within this note       Time User Action Codes Description Comment    11/23/2024  2:22 AM Walter Keene Add [M54.50] Low back pain           ED Disposition       ED Disposition   Admit    Condition   Stable    Date/Time   Sat Nov 23, 2024 10:44 AM    Comment   Case was discussed with [facility] and the patient's admission status was agreed to be Admission Status:  observation status to the service of Dr. Jaffe .               Follow-up Information       Follow up With Specialties Details Why Contact Info Additional Information    Your primary care doctor  Schedule an appointment as soon as possible for a visit in 1 week reevaluation Zamzam Alexander DO   [address] [id number] [phone] (Work)     [facility] Physical Therapy Schedule an appointment as soon as possible for a visit in 1 week reevaluation [phone]          Patient's Medications   Discharge Prescriptions    CYCLOBENZAPRINE (FLEXERIL) 10 MG TABLET    Take 1 tablet (10 mg total) by mouth 2 (two) times a day as needed for muscle spasms for up to 20 doses       Start Date: 11/23/2024End Date: --       Order Dose: 10 mg       Quantity: 20 tablet    Refills: 0    LIDOCAINE (LIDODERM) 5 %    Apply 1 patch topically over 12 hours daily Remove & Discard patch within 12 hours or as directed by MD       Start Date: 11/23/2024End Date: --       Order Dose: 1 patch       Quantity: 7 patch    Refills: 0    METHYLPREDNISOLONE 4 MG TABLET THERAPY PACK    Use as directed on package       Start Date: 11/23/2024End Date: --       Order Dose: --       Quantity: 21 tablet    Refills: 0    NAPROXEN (NAPROSYN) 500 MG TABLET    Take 1 tablet (500 mg total) by mouth 2 (two) times a day with meals for 7 days       Start Date: 11/23/2024End Date: 11/30/2024       Order Dose: 500 mg       Quantity: 14 tablet    Refills: 0            ED Provider  Electronically Signed by     Sukhi Escobar MD  11/23/24 1044       Sukhi Escobar MD  11/23/24 1049

## 2024-11-24 VITALS
TEMPERATURE: 97.6 F | WEIGHT: 225.53 LBS | SYSTOLIC BLOOD PRESSURE: 150 MMHG | HEIGHT: 67 IN | OXYGEN SATURATION: 100 % | BODY MASS INDEX: 35.4 KG/M2 | DIASTOLIC BLOOD PRESSURE: 98 MMHG | RESPIRATION RATE: 16 BRPM | HEART RATE: 54 BPM

## 2024-11-24 LAB
ANION GAP SERPL CALCULATED.3IONS-SCNC: 3 MMOL/L (ref 4–13)
BUN SERPL-MCNC: 20 MG/DL (ref 5–25)
CALCIUM SERPL-MCNC: 8.9 MG/DL (ref 8.4–10.2)
CHLORIDE SERPL-SCNC: 107 MMOL/L (ref 96–108)
CO2 SERPL-SCNC: 29 MMOL/L (ref 21–32)
CREAT SERPL-MCNC: 0.92 MG/DL (ref 0.6–1.3)
ERYTHROCYTE [DISTWIDTH] IN BLOOD BY AUTOMATED COUNT: 14.7 % (ref 11.6–15.1)
GFR SERPL CREATININE-BSD FRML MDRD: 106 ML/MIN/1.73SQ M
GLUCOSE P FAST SERPL-MCNC: 102 MG/DL (ref 65–99)
GLUCOSE SERPL-MCNC: 102 MG/DL (ref 65–140)
HCT VFR BLD AUTO: 42.1 % (ref 36.5–49.3)
HGB BLD-MCNC: 13.5 G/DL (ref 12–17)
MCH RBC QN AUTO: 26.6 PG (ref 26.8–34.3)
MCHC RBC AUTO-ENTMCNC: 32.1 G/DL (ref 31.4–37.4)
MCV RBC AUTO: 83 FL (ref 82–98)
PLATELET # BLD AUTO: 210 THOUSANDS/UL (ref 149–390)
PMV BLD AUTO: 10.2 FL (ref 8.9–12.7)
POTASSIUM SERPL-SCNC: 3.9 MMOL/L (ref 3.5–5.3)
RBC # BLD AUTO: 5.07 MILLION/UL (ref 3.88–5.62)
SODIUM SERPL-SCNC: 139 MMOL/L (ref 135–147)
TSH SERPL DL<=0.05 MIU/L-ACNC: 1.09 UIU/ML (ref 0.45–4.5)
WBC # BLD AUTO: 5.66 THOUSAND/UL (ref 4.31–10.16)

## 2024-11-24 PROCEDURE — 97163 PT EVAL HIGH COMPLEX 45 MIN: CPT

## 2024-11-24 PROCEDURE — 80048 BASIC METABOLIC PNL TOTAL CA: CPT | Performed by: INTERNAL MEDICINE

## 2024-11-24 PROCEDURE — 99239 HOSP IP/OBS DSCHRG MGMT >30: CPT | Performed by: INTERNAL MEDICINE

## 2024-11-24 PROCEDURE — 97166 OT EVAL MOD COMPLEX 45 MIN: CPT

## 2024-11-24 PROCEDURE — 84443 ASSAY THYROID STIM HORMONE: CPT | Performed by: INTERNAL MEDICINE

## 2024-11-24 PROCEDURE — 85027 COMPLETE CBC AUTOMATED: CPT | Performed by: INTERNAL MEDICINE

## 2024-11-24 RX ORDER — PANTOPRAZOLE SODIUM 40 MG/1
40 TABLET, DELAYED RELEASE ORAL
Status: DISCONTINUED | OUTPATIENT
Start: 2024-11-25 | End: 2024-11-24 | Stop reason: HOSPADM

## 2024-11-24 RX ORDER — METHOCARBAMOL 500 MG/1
500 TABLET, FILM COATED ORAL EVERY 6 HOURS SCHEDULED
Qty: 20 TABLET | Refills: 0 | Status: SHIPPED | OUTPATIENT
Start: 2024-11-24 | End: 2024-11-29

## 2024-11-24 RX ORDER — KETOROLAC TROMETHAMINE 10 MG/1
10 TABLET, FILM COATED ORAL EVERY 12 HOURS
Qty: 10 TABLET | Refills: 0 | Status: SHIPPED | OUTPATIENT
Start: 2024-11-24 | End: 2024-11-29

## 2024-11-24 RX ORDER — GABAPENTIN 100 MG/1
100 CAPSULE ORAL 3 TIMES DAILY
Qty: 30 CAPSULE | Refills: 0 | Status: SHIPPED | OUTPATIENT
Start: 2024-11-24 | End: 2024-11-30

## 2024-11-24 RX ORDER — ERGOCALCIFEROL 1.25 MG/1
50000 CAPSULE ORAL WEEKLY
Qty: 1 CAPSULE | Refills: 0 | Status: SHIPPED | OUTPATIENT
Start: 2024-12-01 | End: 2025-02-02

## 2024-11-24 RX ADMIN — KETOROLAC TROMETHAMINE 15 MG: 30 INJECTION, SOLUTION INTRAMUSCULAR; INTRAVENOUS at 06:44

## 2024-11-24 RX ADMIN — ACETAMINOPHEN 975 MG: 325 TABLET, FILM COATED ORAL at 06:44

## 2024-11-24 RX ADMIN — METHOCARBAMOL 500 MG: 750 TABLET ORAL at 06:44

## 2024-11-24 RX ADMIN — ACETAMINOPHEN 975 MG: 325 TABLET, FILM COATED ORAL at 12:35

## 2024-11-24 RX ADMIN — GABAPENTIN 100 MG: 100 CAPSULE ORAL at 08:40

## 2024-11-24 RX ADMIN — KETOROLAC TROMETHAMINE 15 MG: 30 INJECTION, SOLUTION INTRAMUSCULAR; INTRAVENOUS at 01:06

## 2024-11-24 RX ADMIN — NICOTINE 7 MG/24 HR DAILY TRANSDERMAL PATCH 1 PATCH: at 08:41

## 2024-11-24 RX ADMIN — METHOCARBAMOL 500 MG: 750 TABLET ORAL at 01:06

## 2024-11-24 RX ADMIN — METHOCARBAMOL 500 MG: 750 TABLET ORAL at 12:35

## 2024-11-24 RX ADMIN — LEVETIRACETAM 500 MG: 500 TABLET, FILM COATED ORAL at 08:40

## 2024-11-24 NOTE — DISCHARGE SUMMARY
Discharge Summary - Hospitalist   Name: Anabell Ray 34 y.o. male I MRN: [id number]  Unit/Bed#: [place] [address] -01 I Date of Admission: 11/22/2024   Date of Service: 11/24/2024 I Hospital Day: 0       Admitting Provider:  Javi Jaffe DO  Discharge Provider:  Javi Jaffe DO  Admission Date: 11/22/2024       Discharge Date: 11/24/24   LOS: 0  Primary Care Physician at Discharge: No primary care provider on file. None    HOSPITAL COURSE:  Anabell Ray is a 34 y.o. male who presented with back pain which occurred after lifting heavy object out of his job.  He reports that he has had intermittent on and off back issues which typically had resolved with conservative care treatment.  He reports on this episode his pain became persistent he presented to the emergency room for acute evaluation.  In the emergency room he had significant ambulatory dysfunction.  This was despite NSAIDs and Valium given in the emergency room.  He also reported sharp electrical pain shooting down his back and was scheduled for an MRI.  He however was unable to obtain the study due to claustrophobia.    The patient had serial neurologic exams which showed no evidence of saddle anesthesia or bowel incontinence to suggest cauda equina syndrome.  CT of the spine performed 1 day prior demonstrated no acute fracture and there was low clinical suspicion for cauda equina syndrome.    Patient responded appropriate NSAIDs and muscle relaxers and was evaluated by physical therapy and Occupational Therapy.    The patient was in agreement with outpatient follow-up with PT OT and had no further acute inpatient rehab needs    At the time of discharge the patient was tolerating oral diet they were without acute complaint and they were medically cleared for discharge.  All questions were answered the patient's satisfaction and they were in agreement with the discharge plan.    DISCHARGE DIAGNOSES  * Lumbago  Assessment & Plan  Right leg  pain and sciatica in the setting of recent lifting injury  Denies any trauma, reports sharp shooting electrical pains down the back of his leg.  History of previous musculoskeletal back injuries in the past  No focal neurologic deficits to suggest cauda equina syndrome  Deep tendon reflexes are brisk  CT scan performed 11/22/2024 shows no fracture  MRI ordered by the emergency room however due to claustrophobia unable to complete  Will start scheduled nonsteroidal anti-inflammatory  Given family history of diabetes we will check A1c.  Consider corticosteroids if no improvement  Continue muscle relaxer scheduled  Continue heating pad and lidocaine patch as well as Bengay  Physical therapy and Occupational Therapy consultation      Discharge regimen:  Robaxin 500 mg every 6-8 hours as needed for muscle spasm  Ketorolac tablets-for the next 5 days  Bengay or IcyHot topically  Lidocaine patch as needed  Gabapentin 100 mg 3 times a day for the next 5 to 7 days    Outpatient follow-up with PT OT    Vitamin D deficiency  Assessment & Plan  Will start on vitamin D 50,000 units once weekly for 12 weeks  Vitamin D level on 3/2024 was 16    Nicotine dependence  Assessment & Plan  Smokes 4 cigarettes/day    Seizure disorder (HCC)  Assessment & Plan  Continue Keppra      CONSULTING PROVIDERS   None    PROCEDURES PERFORMED  * No surgery found *    RADIOLOGY RESULTS  CT lumbar spine without contrast  Result Date: 11/22/2024  Impression: No evidence of lumbar spine fracture or disc herniation. Unremarkable exam. Workstation performed: [id number]       LABS  Results from last 7 days   Lab Units 11/24/24  0538 11/23/24  0225   WBC Thousand/uL 5.66 8.30   HEMOGLOBIN g/dL 13.5 14.4   HEMATOCRIT % 42.1 44.6   MCV fL 83 83   PLATELETS Thousands/uL 210 233     Results from last 7 days   Lab Units 11/24/24  0538 11/23/24  0225   SODIUM mmol/L 139 135   POTASSIUM mmol/L 3.9 3.9   CHLORIDE mmol/L 107 103   CO2 mmol/L 29 27   BUN mg/dL 20 19  "  CREATININE mg/dL 0.92 0.76   CALCIUM mg/dL 8.9 9.5   EGFR ml/min/1.73sq m 106 117   GLUCOSE RANDOM mg/dL 102 89                      Results from last 7 days   Lab Units 11/23/24  0225   HEMOGLOBIN A1C % 5.9*     Results from last 7 days   Lab Units 11/24/24  0538   TSH 3RD GENERATON uIU/mL 1.094               Cultures:         Invalid input(s): \"URIBILINOGEN\"                    PHYSICAL EXAM:  Vitals:   Blood Pressure: 150/98 (11/24/24 0728)  Pulse: (!) 54 (11/24/24 0728)  Temperature: 97.6 °F (36.4 °C) (11/24/24 0728)  Temp Source: Temporal (11/23/24 2255)  Respirations: 16 (11/24/24 0728)  Height: 5' 7\" (170.2 cm) (11/23/24 1401)  Weight - Scale: 102 kg (225 lb 8.5 oz) (11/24/24 0600)  SpO2: 100 % (11/24/24 0728)      General: well appearing, no acute distress  HEENT: atraumatic, PERRLA, moist mucosa, normal pharynx, normal tonsils and adenoids, normal tongue, no fluid in sinuses  Neck: Trachea midline, no carotid bruit, no masses  Respiratory: normal chest wall expansion, CTA B  Cardiovascular: RRR, no m/r/g, Normal S1 and S2  Abdomen: Soft, non-tender, non-distended, normal bowel sounds in all quadrants, no hepatosplenomegaly, no tympany  Rectal: deferred  Musculoskeletal: Moves all  Integumentary: warm, dry, and pink, with no visible rash, purpura, or petechia  Heme/Lymph: no lymphadenopathy, no bruises  Neurological: Cranial Nerves II-XII grossly intact  Psychiatric: cooperative with normal mood, affect, and cognition       Discharge Disposition: Home/Self Care    AM-PAC Basic Mobility:  Basic Mobility Inpatient Raw Score: 24    JH-HLM Achieved: 8: Walk 250 feet ot more  JH-HLM Goal: 8: Walk 250 feet or more    HLM Goal listed above. Continue with ongoing physical therapy and encourage appropriate mobility to improve upon HLM goals.      Test Results Pending at Discharge:           Medications   Summary of Medication Adjustments made as a result of this hospitalization: See discharge summary and AVS for " medication changes  Medication Dosing Tapers - Please refer to Discharge Medication List for details on any medication dosing tapers (if applicable to patient).  Discharge Medication List: See after visit summary for reconciled discharge medications.     Diet restrictions:         Diet Orders   (From admission, onward)                 Start     Ordered    11/23/24 1412  Diet Ovi/CHO Controlled; Consistent Carbohydrate Diet Level 2 (5 carb servings/75 grams CHO/meal)  Diet effective now        References:    Adult Nutrition Support Algorithm    RD Therapeutic Diet Order Protocol   Question Answer Comment   Diet Type Ovi/CHO Controlled    Ovi/CHO Controlled Consistent Carbohydrate Diet Level 2 (5 carb servings/75 grams CHO/meal)    RD to adjust diet per protocol? Yes        11/23/24 1411                  Activity restrictions: No strenuous activity  Discharge Condition: good    Outpatient Follow-Up and Discharge Instructions  See after visit summary section titled Discharge Instructions for information provided to patient and family.      Code Status: Level 1 - Full Code  Discharge Statement   I spent 86 minutes discharging the patient. This time was spent on the day of discharge. Greater than 50% of total time was spent with the patient and / or family counseling and / or coordination of care.    ** Please Note: This note was completed in part utilizing N[employer] Dragon [employer] One Software.  Grammatical errors, random word insertions, spelling mistakes, and incomplete sentences may be an occasional consequence of this system secondary to software limitations, ambient noise, and hardware issues.  If you have any questions or concerns about the content, text, or information contained within the body of this dictation, please contact the provider for clarification.**
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
## 2024-11-24 NOTE — PLAN OF CARE
Problem: PAIN - ADULT  Goal: Verbalizes/displays adequate comfort level or baseline comfort level  Description: Interventions:  - Encourage patient to monitor pain and request assistance  - Assess pain using appropriate pain scale  - Administer analgesics based on type and severity of pain and evaluate response  - Implement non-pharmacological measures as appropriate and evaluate response  - Consider cultural and social influences on pain and pain management  - Notify physician/advanced practitioner if interventions unsuccessful or patient reports new pain  Outcome: Progressing     Problem: INFECTION - ADULT  Goal: Absence or prevention of progression during hospitalization  Description: INTERVENTIONS:  - Assess and monitor for signs and symptoms of infection  - Monitor lab/diagnostic results  - Monitor all insertion sites, i.e. indwelling lines, tubes, and drains  - Monitor endotracheal if appropriate and nasal secretions for changes in amount and color  - [place] appropriate cooling/warming therapies per order  - Administer medications as ordered  - Instruct and encourage patient and family to use good hand hygiene technique  - Identify and instruct in appropriate isolation precautions for identified infection/condition  Outcome: Progressing  Goal: Absence of fever/infection during neutropenic period  Description: INTERVENTIONS:  - Monitor WBC    Outcome: Progressing     Problem: SAFETY ADULT  Goal: Patient will remain free of falls  Description: INTERVENTIONS:  - Educate patient/family on patient safety including physical limitations  - Instruct patient to call for assistance with activity   - Consult OT/PT to assist with strengthening/mobility   - Keep Call bell within reach  - Keep bed low and locked with side rails adjusted as appropriate  - Keep care items and personal belongings within reach  - Initiate and maintain comfort rounds  - Make Fall Risk Sign visible to staff  - Offer Toileting every 2 Hours,  in advance of need  - Initiate/Maintain bed alarm  - Obtain necessary fall risk management equipment: walker.  - Apply yellow socks and bracelet for high fall risk patients  - Consider moving patient to room near nurses station  Outcome: Progressing  Goal: Maintain or return to baseline ADL function  Description: INTERVENTIONS:  -  Assess patient's ability to carry out ADLs; assess patient's baseline for ADL function and identify physical deficits which impact ability to perform ADLs (bathing, care of mouth/teeth, toileting, grooming, dressing, etc.)  - Assess/evaluate cause of self-care deficits   - Assess range of motion  - Assess patient's mobility; develop plan if impaired  - Assess patient's need for assistive devices and provide as appropriate  - Encourage maximum independence but intervene and supervise when necessary  - Involve family in performance of ADLs  - Assess for home care needs following discharge   - Consider OT consult to assist with ADL evaluation and planning for discharge  - Provide patient education as appropriate  Outcome: Progressing  Goal: Maintains/Returns to pre admission functional level  Description: INTERVENTIONS:  - Perform AM-PAC 6 Click Basic Mobility/ Daily Activity assessment daily.  - Set and communicate daily mobility goal to care team and patient/family/caregiver.   - Collaborate with rehabilitation services on mobility goals if consulted  - Perform Range of Motion 3 times a day.  - Reposition patient every 2 hours.  - Dangle patient 3 times a day  - Stand patient 3 times a day  - Ambulate patient 3 times a day  - Out of bed to chair 3 times a day   - Out of bed for meals 3 times a day  - Out of bed for toileting  - Record patient progress and toleration of activity level   Outcome: Progressing     Problem: DISCHARGE PLANNING  Goal: Discharge to home or other facility with appropriate resources  Description: INTERVENTIONS:  - Identify barriers to discharge w/patient and  caregiver  - Arrange for needed discharge resources and transportation as appropriate  - Identify discharge learning needs (meds, wound care, etc.)  - Arrange for interpretive services to assist at discharge as needed  - Refer to Case Management Department for coordinating discharge planning if the patient needs post-hospital services based on physician/advanced practitioner order or complex needs related to functional status, cognitive ability, or social support system  Outcome: Progressing     Problem: Knowledge Deficit  Goal: Patient/family/caregiver demonstrates understanding of disease process, treatment plan, medications, and discharge instructions  Description: Complete learning assessment and assess knowledge base.  Interventions:  - Provide teaching at level of understanding  - Provide teaching via preferred learning methods  Outcome: Progressing

## 2024-11-24 NOTE — DISCHARGE INSTR - AVS FIRST PAGE
Dear Anabell Ray,     It was our pleasure to care for you here at [facility].  It is our hope that we were always able to exceed the expected standards for your care during your stay.  You were hospitalized due to acute lumbago complicated by sciatica.  You were cared for on the second floor by Javi Jaffe, DO with the [facility] who covers for your primary care physician (PCP), No primary care provider on file., while you were hospitalized.  If you have any questions or concerns related to this hospitalization, you may contact us at .  For follow up as well as any medication refills, we recommend that you follow up with your primary care physician.  A registered nurse will reach out to you by phone within a few days after your discharge to answer any additional questions that you may have after going home.  However, at this time we provide for you here, the most important instructions / recommendations at discharge:     Notable Medication Adjustments -   You will be discharged on a nonsteroidal anti-inflammatory -this is ketorolac tablets, will take these over the next 5 days  Robaxin which is a muscle relaxer-this can cause some sedation and would not recommend driving or operating heavy machinery if you are on this medication  Lidocaine patches are available over-the-counter,, commend 4% lidocaine patches  You can apply IcyHot, Bengay or Salonpas to help with your pain  Gabapentin - which is a medication for nerve pain  Vitamin D 50,000 units once a week for 12 weeks - Low Vitamin D levels.  Testing Required after Discharge -   If no improvement after Physical therapy, would recommend additional imaging  Important follow up information -   PCP follow up in one week  Other Instructions -   None  Please review this entire after visit summary as additional general instructions including medication list, appointments, activity, diet,  any pertinent wound care, and other additional recommendations from your care team that may be provided for you.      Sincerely,     Javi Jaffe DO and Nurse Seth LASSITER

## 2024-11-24 NOTE — PLAN OF CARE
Problem: PAIN - ADULT  Goal: Verbalizes/displays adequate comfort level or baseline comfort level  Description: Interventions:  - Encourage patient to monitor pain and request assistance  - Assess pain using appropriate pain scale  - Administer analgesics based on type and severity of pain and evaluate response  - Implement non-pharmacological measures as appropriate and evaluate response  - Consider cultural and social influences on pain and pain management  - Notify physician/advanced practitioner if interventions unsuccessful or patient reports new pain  11/24/2024 1340 by Seth Dudley RN  Outcome: Adequate for Discharge  11/24/2024 0924 by Seth Dudley RN  Outcome: Progressing     Problem: INFECTION - ADULT  Goal: Absence or prevention of progression during hospitalization  Description: INTERVENTIONS:  - Assess and monitor for signs and symptoms of infection  - Monitor lab/diagnostic results  - Monitor all insertion sites, i.e. indwelling lines, tubes, and drains  - Monitor endotracheal if appropriate and nasal secretions for changes in amount and color  - [place] appropriate cooling/warming therapies per order  - Administer medications as ordered  - Instruct and encourage patient and family to use good hand hygiene technique  - Identify and instruct in appropriate isolation precautions for identified infection/condition  11/24/2024 1340 by Seth Dudley RN  Outcome: Adequate for Discharge  11/24/2024 0924 by Seth Dudley RN  Outcome: Progressing  Goal: Absence of fever/infection during neutropenic period  Description: INTERVENTIONS:  - Monitor WBC    11/24/2024 1340 by Seth Dudley RN  Outcome: Adequate for Discharge  11/24/2024 0924 by Seth Dudley RN  Outcome: Progressing     Problem: SAFETY ADULT  Goal: Patient will remain free of falls  Description: INTERVENTIONS:  - Educate patient/family on patient safety including physical limitations  - Instruct patient to call for assistance with  activity   - Consult OT/PT to assist with strengthening/mobility   - Keep Call bell within reach  - Keep bed low and locked with side rails adjusted as appropriate  - Keep care items and personal belongings within reach  - Initiate and maintain comfort rounds  - Make Fall Risk Sign visible to staff  - Offer Toileting every 2 Hours, in advance of need  - Initiate/Maintain bed alarm  - Obtain necessary fall risk management equipment: walker.  - Apply yellow socks and bracelet for high fall risk patients  - Consider moving patient to room near nurses station  11/24/2024 1340 by Seth Dudley RN  Outcome: Adequate for Discharge  11/24/2024 0924 by Seth Dudley RN  Outcome: Progressing  Goal: Maintain or return to baseline ADL function  Description: INTERVENTIONS:  -  Assess patient's ability to carry out ADLs; assess patient's baseline for ADL function and identify physical deficits which impact ability to perform ADLs (bathing, care of mouth/teeth, toileting, grooming, dressing, etc.)  - Assess/evaluate cause of self-care deficits   - Assess range of motion  - Assess patient's mobility; develop plan if impaired  - Assess patient's need for assistive devices and provide as appropriate  - Encourage maximum independence but intervene and supervise when necessary  - Involve family in performance of ADLs  - Assess for home care needs following discharge   - Consider OT consult to assist with ADL evaluation and planning for discharge  - Provide patient education as appropriate  11/24/2024 1340 by Seth Dudley RN  Outcome: Adequate for Discharge  11/24/2024 0924 by Seth Dudley RN  Outcome: Progressing  Goal: Maintains/Returns to pre admission functional level  Description: INTERVENTIONS:  - Perform AM-PAC 6 Click Basic Mobility/ Daily Activity assessment daily.  - Set and communicate daily mobility goal to care team and patient/family/caregiver.   - Collaborate with rehabilitation services on mobility goals if  consulted  - Perform Range of Motion 3 times a day.  - Reposition patient every 2 hours.  - Dangle patient 3 times a day  - Stand patient 3 times a day  - Ambulate patient 3 times a day  - Out of bed to chair 3 times a day   - Out of bed for meals 3 times a day  - Out of bed for toileting  - Record patient progress and toleration of activity level   11/24/2024 1340 by Seth Dudley RN  Outcome: Adequate for Discharge  11/24/2024 0924 by Seth Dudley RN  Outcome: Progressing     Problem: DISCHARGE PLANNING  Goal: Discharge to home or other facility with appropriate resources  Description: INTERVENTIONS:  - Identify barriers to discharge w/patient and caregiver  - Arrange for needed discharge resources and transportation as appropriate  - Identify discharge learning needs (meds, wound care, etc.)  - Arrange for interpretive services to assist at discharge as needed  - Refer to Case Management Department for coordinating discharge planning if the patient needs post-hospital services based on physician/advanced practitioner order or complex needs related to functional status, cognitive ability, or social support system  11/24/2024 1340 by Seth Dudley RN  Outcome: Adequate for Discharge  11/24/2024 0924 by Seth Dudley RN  Outcome: Progressing     Problem: Knowledge Deficit  Goal: Patient/family/caregiver demonstrates understanding of disease process, treatment plan, medications, and discharge instructions  Description: Complete learning assessment and assess knowledge base.  Interventions:  - Provide teaching at level of understanding  - Provide teaching via preferred learning methods  11/24/2024 1340 by Seth Dudley RN  Outcome: Adequate for Discharge  11/24/2024 0924 by Seth Dudley RN  Outcome: Progressing

## 2024-11-24 NOTE — CASE MANAGEMENT
Case Management Discharge Planning Note    Patient name Anabell Ray  Location [facility] M* MRN [id number]  : 1990 Date 2024       Current Admission Date: 2024  Current Admission Diagnosis:Lumbago   Patient Active Problem List    Diagnosis Date Noted Date Diagnosed    Lumbago 2024     Seizure disorder (HCC) 2024     Nicotine dependence 2024     Vitamin D deficiency 2024       LOS (days): 0  Geometric Mean LOS (GMLOS) (days):   Days to GMLOS:     OBJECTIVE:            Current admission status: Observation   Preferred Pharmacy:   [id number] [place] - [address]  [address]  [place] [address]  Phone: [phone] Fax: [phone]    Primary Care Provider: No primary care provider on file.    Primary Insurance: [employer] [facility]  Secondary Insurance:     DISCHARGE DETAILS:                                                                                                 Additional Comments: PT/OT recommended walker, ordered through [facility], delivered to bedside from [facility].

## 2024-11-24 NOTE — NURSING NOTE
Patient discharged 11/24/2024 1:41 PM. AVS and discharge instructions reviewed with patient. All questions answered. Patient verbalized having all belongings for discharge. Patient walked to exit by RN to be picked up by family.    Seth Dudley 11/24/2024 1:42 PM

## 2024-11-24 NOTE — PLAN OF CARE
Problem: PAIN - ADULT  Goal: Verbalizes/displays adequate comfort level or baseline comfort level  Description: Interventions:  - Encourage patient to monitor pain and request assistance  - Assess pain using appropriate pain scale  - Administer analgesics based on type and severity of pain and evaluate response  - Implement non-pharmacological measures as appropriate and evaluate response  - Consider cultural and social influences on pain and pain management  - Notify physician/advanced practitioner if interventions unsuccessful or patient reports new pain  Outcome: Progressing     Problem: INFECTION - ADULT  Goal: Absence or prevention of progression during hospitalization  Description: INTERVENTIONS:  - Assess and monitor for signs and symptoms of infection  - Monitor lab/diagnostic results  - Monitor all insertion sites, i.e. indwelling lines, tubes, and drains  - Monitor endotracheal if appropriate and nasal secretions for changes in amount and color  - [place] appropriate cooling/warming therapies per order  - Administer medications as ordered  - Instruct and encourage patient and family to use good hand hygiene technique  - Identify and instruct in appropriate isolation precautions for identified infection/condition  Outcome: Progressing  Goal: Absence of fever/infection during neutropenic period  Description: INTERVENTIONS:  - Monitor WBC    Outcome: Progressing     Problem: SAFETY ADULT  Goal: Patient will remain free of falls  Description: INTERVENTIONS:  - Educate patient/family on patient safety including physical limitations  - Instruct patient to call for assistance with activity   - Consult OT/PT to assist with strengthening/mobility   - Keep Call bell within reach  - Keep bed low and locked with side rails adjusted as appropriate  - Keep care items and personal belongings within reach  - Initiate and maintain comfort rounds  - Make Fall Risk Sign visible to staff  - Offer Toileting every 2 Hours,  in advance of need  - Initiate/Maintain bed alarm  - Obtain necessary fall risk management equipment: walker.  - Apply yellow socks and bracelet for high fall risk patients  - Consider moving patient to room near nurses station  Outcome: Progressing  Goal: Maintain or return to baseline ADL function  Description: INTERVENTIONS:  -  Assess patient's ability to carry out ADLs; assess patient's baseline for ADL function and identify physical deficits which impact ability to perform ADLs (bathing, care of mouth/teeth, toileting, grooming, dressing, etc.)  - Assess/evaluate cause of self-care deficits   - Assess range of motion  - Assess patient's mobility; develop plan if impaired  - Assess patient's need for assistive devices and provide as appropriate  - Encourage maximum independence but intervene and supervise when necessary  - Involve family in performance of ADLs  - Assess for home care needs following discharge   - Consider OT consult to assist with ADL evaluation and planning for discharge  - Provide patient education as appropriate  Outcome: Progressing  Goal: Maintains/Returns to pre admission functional level  Description: INTERVENTIONS:  - Perform AM-PAC 6 Click Basic Mobility/ Daily Activity assessment daily.  - Set and communicate daily mobility goal to care team and patient/family/caregiver.   - Collaborate with rehabilitation services on mobility goals if consulted  - Perform Range of Motion 3 times a day.  - Reposition patient every 2 hours.  - Dangle patient 3 times a day  - Stand patient 3 times a day  - Ambulate patient 3 times a day  - Out of bed to chair 3 times a day   - Out of bed for meals 3 times a day  - Out of bed for toileting  - Record patient progress and toleration of activity level   Outcome: Progressing     Problem: DISCHARGE PLANNING  Goal: Discharge to home or other facility with appropriate resources  Description: INTERVENTIONS:  - Identify barriers to discharge w/patient and  caregiver  - Arrange for needed discharge resources and transportation as appropriate  - Identify discharge learning needs (meds, wound care, etc.)  - Arrange for interpretive services to assist at discharge as needed  - Refer to Case Management Department for coordinating discharge planning if the patient needs post-hospital services based on physician/advanced practitioner order or complex needs related to functional status, cognitive ability, or social support system  Outcome: Progressing     Problem: Knowledge Deficit  Goal: Patient/family/caregiver demonstrates understanding of disease process, treatment plan, medications, and discharge instructions  Description: Complete learning assessment and assess knowledge base.  Interventions:  - Provide teaching at level of understanding  - Provide teaching via preferred learning methods  Outcome: Progressing

## 2024-11-24 NOTE — OCCUPATIONAL THERAPY NOTE
"    Occupational Therapy Evaluation     Patient Name: Anabell Ray  Today's Date: 11/24/2024  Problem List  Principal Problem:    Lumbago  Active Problems:    Seizure disorder (HCC)    Nicotine dependence    Vitamin D deficiency    Past Medical History  Past Medical History:   Diagnosis Date    Hypertension     Psychiatric disorder     Seizures (HCC)      Past Surgical History  History reviewed. No pertinent surgical history.        11/24/24 4493   Note Type   Note type Evaluation   Pain Assessment   Pain Assessment Tool 0-10   Pain Score 4   Pain Location/Orientation Orientation: Lower;Location: Back   Pain Rating: FLACC (Rest) - Face 0   Pain Rating: FLACC (Rest) - Legs 0   Pain Rating: FLACC (Rest) - Activity 0   Pain Rating: FLACC (Rest) - Cry 0   Pain Rating: FLACC (Rest) - Consolability 0   Score: FLACC (Rest) 0   Restrictions/Precautions   Weight Bearing Precautions Per Order No   Other Precautions Spinal precautions;Fall Risk;Pain   Home Living   Type of Home Apartment  (13-14 [address] with rail)   Home Layout One level   Bathroom Shower/Tub Tub/shower unit   Bathroom Toilet Standard   Bathroom Equipment Grab bars in shower;Grab bars around toilet   Home Equipment Cane   Prior Function   Level of [place] Independent with ADLs;Independent with functional mobility   Lives With Spouse;Son  (1y/o son)   Falls in the last 6 months 1 to 4  (2)   Comments PTA pt states independence with his ADLs, transfers, ambulation--recent use of SPC; +, +falls=2, neg home alone   Lifestyle   Autonomy PTA pt states independence with his ADLs, transfers, ambulation--recent use of SPC; +, +falls=2, neg home alone   Reciprocal Relationships supportive wife, 1y/o son   Service to 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   Intrinsic Gratification spending time with family   Subjective   Subjective \"My wife was looking at getting me an electric scooter.\"   ADL   Where Assessed Chair "   Eating Assistance 6  Modified independent   Grooming Assistance 6  Modified Independent   UB Bathing Assistance 5  Supervision/Setup   LB Bathing Assistance 5  Supervision/Setup   UB Dressing Assistance 5  Supervision/Setup   LB Dressing Assistance 5  Supervision/Setup   Toileting Assistance  5  Supervision/Setup   Transfers   Sit to Stand 5  Supervision   Additional items Increased time required;Verbal cues   Stand to Sit 5  Supervision   Additional items Increased time required;Verbal cues   Functional Mobility   Functional Mobility 5  Supervision   Additional items Rolling walker   Balance   Static Sitting Normal   Dynamic Sitting Good   Static Standing Fair   Dynamic Standing Fair -   Activity Tolerance   Activity Tolerance Patient limited by fatigue;Patient limited by pain   Medical Staff Made Aware uyen, P.T., MD   RUE Assessment   RUE Assessment WFL   RUE Strength   RUE Overall Strength Within Functional Limits - able to perform ADL tasks with strength  (4+/5 throughout)   LUE Assessment   LUE Assessment WFL   LUE Strength   LUE Overall Strength Within Functional Limits - able to perform ADL tasks with strength  (4+/5 throughout)   Hand Function   Gross Motor Coordination Functional   Fine Motor Coordination Functional   Sensation   Light Touch No apparent deficits   Proprioception   Proprioception No apparent deficits   Vision-Basic Assessment   Current Vision   (no glasses)   Vision - Complex Assessment   Acuity Able to read clock/calendar on wall without difficulty   Psychosocial   Psychosocial (WDL) WDL   Perception   Inattention/Neglect Appears intact   Cognition   Overall Cognitive Status WFL   Arousal/Participation Alert   Attention Within functional limits   Orientation Level Oriented X4   Memory Within functional limits   Following Commands Follows all commands and directions without difficulty   Assessment   Limitation Decreased endurance;Decreased high-level ADLs   Prognosis Good   Assessment Pt  "is a 33y/o male admitted to the hospital 2* c/o persistent LBP; CT(L-spine)=neg acute findings. Pt dx lumbago. Pt with PMH HTN, psychiatric disorder, seizures, tobacco abuse. PTA pt states independence with his ADLs, transfers, ambulation--recent use of SPC; +, +falls=2, neg home alone. During initial eval, pt demonstrated slight deficits with his functional balance, functional mobility, and activity tolerance. Pt was able to demonstrate good ADL status and states being close to his functional baseline. Pt would benefit from a restorative program on the unit to improve his overall endurance, balance, and mobility. Acute OT tx not indicated at this time 2* pt's limited functional deficits. The patient's raw score on the AM-PAC Daily Activity Inpatient Short Form is 22. A raw score of greater than or equal to 19 suggests the patient may benefit from discharge to home. Please refer to the recommendation of the Occupational Therapist for safe discharge planning.   Goals   Patient Goals \"to lose some more weight.\"   Plan   OT Frequency Eval only   Discharge Recommendation   Rehab Resource Intensity Level, OT III (Minimum Resource Intensity)  (OPPT for back strengthening)   Equipment Recommended   (RW for home)   AM-PAC Daily Activity Inpatient   Lower Body Dressing 3   Bathing 3   Toileting 4   Upper Body Dressing 4   Grooming 4   Eating 4   Daily Activity Raw Score 22   Daily Activity Standardized Score (Calc for Raw Score >=11) 47.1   AM-PAC Applied Cognition Inpatient   Following a Speech/Presentation 4   Understanding Ordinary Conversation 4   Taking Medications 4   Remembering Where Things Are Placed or Put Away 4   Remembering List of 4-5 Errands 4   Taking Care of Complicated Tasks 4   Applied Cognition Raw Score 24   Applied Cognition Standardized Score 62.21   Fede Oscar       "

## 2024-11-26 LAB
DME PARACHUTE DELIVERY DATE ACTUAL: NORMAL
DME PARACHUTE DELIVERY DATE REQUESTED: NORMAL
DME PARACHUTE ITEM DESCRIPTION: NORMAL
DME PARACHUTE ORDER STATUS: NORMAL
DME PARACHUTE SUPPLIER NAME: NORMAL
DME PARACHUTE SUPPLIER PHONE: NORMAL

## 2024-11-29 ENCOUNTER — HOSPITAL ENCOUNTER (OUTPATIENT)
Facility: HOSPITAL | Age: 34
Setting detail: OBSERVATION
Discharge: HOME | End: 2024-11-30
Attending: EMERGENCY MEDICINE | Admitting: HOSPITALIST
Payer: MEDICARE

## 2024-11-29 DIAGNOSIS — M54.40 ACUTE RIGHT-SIDED LOW BACK PAIN WITH SCIATICA, SCIATICA LATERALITY UNSPECIFIED: ICD-10-CM

## 2024-11-29 DIAGNOSIS — M54.40 BACK PAIN OF LUMBAR REGION WITH SCIATICA: Primary | ICD-10-CM

## 2024-11-29 DIAGNOSIS — M54.50 LOW BACK PAIN: ICD-10-CM

## 2024-11-29 PROBLEM — E66.9 OBESITY: Status: ACTIVE | Noted: 2024-11-29

## 2024-11-29 PROBLEM — G47.00 INSOMNIA: Status: ACTIVE | Noted: 2024-11-29

## 2024-11-29 PROCEDURE — 99222 1ST HOSP IP/OBS MODERATE 55: CPT | Performed by: PHYSICIAN ASSISTANT

## 2024-11-29 PROCEDURE — 99284 EMERGENCY DEPT VISIT MOD MDM: CPT

## 2024-11-29 PROCEDURE — 96361 HYDRATE IV INFUSION ADD-ON: CPT

## 2024-11-29 PROCEDURE — 99285 EMERGENCY DEPT VISIT HI MDM: CPT

## 2024-11-29 PROCEDURE — 96374 THER/PROPH/DIAG INJ IV PUSH: CPT

## 2024-11-29 RX ORDER — METHOCARBAMOL 500 MG/1
500 TABLET, FILM COATED ORAL EVERY 6 HOURS SCHEDULED
Status: DISCONTINUED | OUTPATIENT
Start: 2024-11-30 | End: 2024-11-30 | Stop reason: HOSPADM

## 2024-11-29 RX ORDER — DIPHENHYDRAMINE HCL 25 MG
25 TABLET ORAL
Status: DISCONTINUED | OUTPATIENT
Start: 2024-11-29 | End: 2024-11-30 | Stop reason: HOSPADM

## 2024-11-29 RX ORDER — KETOROLAC TROMETHAMINE 30 MG/ML
30 INJECTION, SOLUTION INTRAMUSCULAR; INTRAVENOUS EVERY 6 HOURS PRN
Status: DISCONTINUED | OUTPATIENT
Start: 2024-11-29 | End: 2024-11-30 | Stop reason: HOSPADM

## 2024-11-29 RX ORDER — ONDANSETRON 2 MG/ML
4 INJECTION INTRAMUSCULAR; INTRAVENOUS EVERY 6 HOURS PRN
Status: DISCONTINUED | OUTPATIENT
Start: 2024-11-29 | End: 2024-11-30 | Stop reason: HOSPADM

## 2024-11-29 RX ORDER — GABAPENTIN 100 MG/1
100 CAPSULE ORAL 3 TIMES DAILY
Status: DISCONTINUED | OUTPATIENT
Start: 2024-11-29 | End: 2024-11-29

## 2024-11-29 RX ORDER — ACETAMINOPHEN 325 MG/1
975 TABLET ORAL EVERY 6 HOURS PRN
Status: DISCONTINUED | OUTPATIENT
Start: 2024-11-29 | End: 2024-11-30 | Stop reason: HOSPADM

## 2024-11-29 RX ORDER — HEPARIN SODIUM 5000 [USP'U]/ML
5000 INJECTION, SOLUTION INTRAVENOUS; SUBCUTANEOUS EVERY 8 HOURS SCHEDULED
Status: DISCONTINUED | OUTPATIENT
Start: 2024-11-29 | End: 2024-11-30 | Stop reason: HOSPADM

## 2024-11-29 RX ORDER — GABAPENTIN 100 MG/1
200 CAPSULE ORAL 3 TIMES DAILY
Status: DISCONTINUED | OUTPATIENT
Start: 2024-11-30 | End: 2024-11-30 | Stop reason: HOSPADM

## 2024-11-29 RX ORDER — NICOTINE 21 MG/24HR
1 PATCH, TRANSDERMAL 24 HOURS TRANSDERMAL DAILY
Status: DISCONTINUED | OUTPATIENT
Start: 2024-11-30 | End: 2024-11-30 | Stop reason: HOSPADM

## 2024-11-29 RX ORDER — METHOCARBAMOL 500 MG/1
500 TABLET, FILM COATED ORAL EVERY 6 HOURS SCHEDULED
Qty: 20 TABLET | Refills: 0 | Status: SHIPPED | OUTPATIENT
Start: 2024-11-29 | End: 2024-11-29 | Stop reason: CLARIF

## 2024-11-29 RX ORDER — LIDOCAINE 50 MG/G
1 PATCH TOPICAL ONCE
Status: COMPLETED | OUTPATIENT
Start: 2024-11-29 | End: 2024-11-30

## 2024-11-29 RX ORDER — AMOXICILLIN 250 MG
1 CAPSULE ORAL
Status: DISCONTINUED | OUTPATIENT
Start: 2024-11-29 | End: 2024-11-30 | Stop reason: HOSPADM

## 2024-11-29 RX ORDER — HYDROMORPHONE HCL/PF 1 MG/ML
0.5 SYRINGE (ML) INJECTION ONCE
Status: COMPLETED | OUTPATIENT
Start: 2024-11-29 | End: 2024-11-29

## 2024-11-29 RX ORDER — LEVETIRACETAM 500 MG/1
500 TABLET ORAL EVERY 12 HOURS SCHEDULED
Status: DISCONTINUED | OUTPATIENT
Start: 2024-11-29 | End: 2024-11-30 | Stop reason: HOSPADM

## 2024-11-29 RX ORDER — CYCLOBENZAPRINE HCL 10 MG
10 TABLET ORAL ONCE
Status: COMPLETED | OUTPATIENT
Start: 2024-11-29 | End: 2024-11-29

## 2024-11-29 RX ORDER — KETOROLAC TROMETHAMINE 10 MG/1
10 TABLET, FILM COATED ORAL EVERY 12 HOURS
Qty: 10 TABLET | Refills: 0 | Status: SHIPPED | OUTPATIENT
Start: 2024-11-29 | End: 2024-11-29 | Stop reason: CLARIF

## 2024-11-29 RX ORDER — GABAPENTIN 100 MG/1
100 CAPSULE ORAL ONCE
Status: COMPLETED | OUTPATIENT
Start: 2024-11-29 | End: 2024-11-30

## 2024-11-29 RX ADMIN — CYCLOBENZAPRINE 10 MG: 10 TABLET, FILM COATED ORAL at 16:35

## 2024-11-29 RX ADMIN — GABAPENTIN 100 MG: 100 CAPSULE ORAL at 23:02

## 2024-11-29 RX ADMIN — KETOROLAC TROMETHAMINE 30 MG: 30 INJECTION, SOLUTION INTRAMUSCULAR; INTRAVENOUS at 23:24

## 2024-11-29 RX ADMIN — HEPARIN SODIUM 5000 UNITS: 5000 INJECTION INTRAVENOUS; SUBCUTANEOUS at 23:02

## 2024-11-29 RX ADMIN — HYDROMORPHONE HYDROCHLORIDE 0.5 MG: 1 INJECTION, SOLUTION INTRAMUSCULAR; INTRAVENOUS; SUBCUTANEOUS at 15:40

## 2024-11-29 RX ADMIN — SODIUM CHLORIDE 1000 ML: 0.9 INJECTION, SOLUTION INTRAVENOUS at 15:40

## 2024-11-29 RX ADMIN — LEVETIRACETAM 500 MG: 500 TABLET, FILM COATED ORAL at 23:02

## 2024-11-29 RX ADMIN — LIDOCAINE 1 PATCH: 50 PATCH TOPICAL at 15:41

## 2024-11-29 RX ADMIN — SENNOSIDES AND DOCUSATE SODIUM 1 TABLET: 8.6; 5 TABLET ORAL at 23:22

## 2024-11-29 RX ADMIN — DIPHENHYDRAMINE HYDROCHLORIDE 25 MG: 25 TABLET ORAL at 23:22

## 2024-11-29 RX ADMIN — METHOCARBAMOL 500 MG: 500 TABLET ORAL at 23:02

## 2024-11-29 NOTE — ED ATTENDING ATTESTATION
11/29/2024  I, Jorge Cano MD, saw and evaluated the patient. I have discussed the patient with the resident/non-physician practitioner and agree with the resident's/non-physician practitioner's findings, Plan of Care, and MDM as documented in the resident's/non-physician practitioner's note, except where noted. All available labs and Radiology studies were reviewed.  I was present for key portions of any procedure(s) performed by the resident/non-physician practitioner and I was immediately available to provide assistance.       At this point I agree with the current assessment done in the Emergency Department.  I have conducted an independent evaluation of this patient a history and physical is as follows:    33 YO male presents with back pain, recently admitted for same. Recommended at that time for MRI, patient unable to tolerate this as he is clautrophobic. Patient states pain radiates down the Left leg.     Gen: Pt is in NAD, uncomfortable  HEENT: Head is atraumatic, EOM's intact, neck has FROM  Chest: CTAB, non-tender  Heart: RRR  Abdomen: Soft, NT/ND  Musculoskeletal: FROM in all extremities  Skin: No rash, no ecchymosis  Neuro: Awake, alert, oriented x4; Cranial nerves II-XII intact  Psych: Normal affect    MDM -  Patient with ongoing back pain, recently admitted and discharged for same, has not been able to follow up with PT for this yet. Will treat with IV analgesia, determine ability to ambulate.    ED Course         Critical Care Time  Procedures

## 2024-11-29 NOTE — ED PROVIDER NOTES
Time reflects when diagnosis was documented in both MDM as applicable and the Disposition within this note       Time User Action Codes Description Comment    11/29/2024  5:16 PM Ernesto Amor [M54.40] Back pain of lumbar region with sciatica     11/29/2024  5:17 PM Ernesto Amor [M54.50] Low back pain     11/29/2024  5:17 PM Ernesto Amor [M54.40] Acute right-sided low back pain with sciatica, sciatica laterality unspecified           ED Disposition       ED Disposition   Admit    Condition   Stable    Date/Time   Fri Nov 29, 2024  7:25 PM    Comment   Case was discussed with Dr. Arturo Amor and the patient's admission status was agreed to be Admission Status: inpatient status to the service of Dr. Arturo Amor .               Assessment & Plan       Medical Decision Making  Anabell is a 35 y/o male with pertinent PMHx of back pain presenting to the ED with back pain and radiation down his left leg. He was recently seen on 11/21 and 11/22 for the same complaint, for which he was eventually admitted. CT imaging of the lumbar spine was unremarkable. MRI was ordered but not performed due to the patient being claustrophobic and not tolerating the procedure. He was discharged with Toradol, gabapentin, Robaxin and lidocaine patches for pain relief and given referral for PT. Patient presents today with back pain localized to the lumbar spine with radiation down the anterior and posterior aspect of his left leg. He reports no new pain, numbness, weakness, saddle anesthesia, urinary/bowel incontinence, urinary retention, or injury.     Differential diagnoses include but not limited to lumbar strain, sciatica, lumbar radiculopathy, spinal stenosis. Low concern for cauda equina as patient has no new or changing symptoms, and he does not have any red flag symptoms such as saddle anesthesia, urinary/bowel incontinence, or urinary retention. High suspicion that symptoms are due to a lumbar strain given the patient's  history of heavy lifting at work that preceded his pain. No new imaging ordered given that previous imaging was benign and patient is not endorsing any new or changing symptoms. Patient will be given fluids, Dilaudid 0.5 mg IV and lidocaine topical patch and reevaluated for response to therapy.     Patient reevaluated following analgesic administration. Patient reports significant improvement in his pain at rest, with some lingering shooting pain in his leg still present. Patient given 10 mg of cyclobenzaprine. Discussed with patient that our goal for his visit today is to gain pain control so that he can manage his symptoms outpatient. Patient was understanding to this plan and agreed that he shares the same expectations. Explained to patient that before he can be discharged home he must demonstrate that he can ambulate independently, with or without the use of assistive devices. Patient expressed willingness to try this. Nursing staff attempted to ambulate Anabell but stated that he was unable to stand up from the edge of the bed. Patient seen again by provider and he was unable to sit up in bed during this attempt at ambulation.     At this point, patient is unable to ambulate independently and therefore a concern for safety if he were to be discharged in this condition. Discussed this case with Dr. Arturo Amor who agreed that an admission would be appropriate at this time. Patient admitted under inpatient status for further management.     Risk  Prescription drug management.  Decision regarding hospitalization.             Medications   lidocaine (LIDODERM) 5 % patch 1 patch (1 patch Topical Medication Applied 11/29/24 1541)   HYDROmorphone (DILAUDID) injection 0.5 mg (0.5 mg Intravenous Given 11/29/24 1540)   sodium chloride 0.9 % bolus 1,000 mL (0 mL Intravenous Stopped 11/29/24 1816)   cyclobenzaprine (FLEXERIL) tablet 10 mg (10 mg Oral Given 11/29/24 1635)       ED Risk Strat Scores                            SBIRT 20yo+      Flowsheet Row Most Recent Value   Initial Alcohol Screen: US AUDIT-C     1. How often do you have a drink containing alcohol? 1 Filed at: 11/29/2024 1456   2. How many drinks containing alcohol do you have on a typical day you are drinking?  1 Filed at: 11/29/2024 1456   3a. Male UNDER 65: How often do you have five or more drinks on one occasion? 0 Filed at: 11/29/2024 1456   3b. FEMALE Any Age, or MALE 65+: How often do you have 4 or more drinks on one occassion? 0 Filed at: 11/29/2024 1456   Audit-C Score 2 Filed at: 11/29/2024 1456   JUAN A: How many times in the past year have you...    Used an illegal drug or used a prescription medication for non-medical reasons? Never Filed at: 11/29/2024 1456                            History of Present Illness       Chief Complaint   Patient presents with    Back Pain     Pt arrives via ems, from home. Pt reports L sided sciatica back pain for x1week. Pt was walking around apt when L leg gave out and pt slowly fell to the floor. Pt has been coming to the ED x3 in the past week.       Past Medical History:   Diagnosis Date    Hypertension     Psychiatric disorder     Seizures (HCC)       History reviewed. No pertinent surgical history.   History reviewed. No pertinent family history.   Social History     Tobacco Use    Smoking status: Every Day     Average packs/day: 0.5 packs/day for 11.0 years (5.5 ttl pk-yrs)     Types: Cigarettes     Start date: 2013    Smokeless tobacco: Never   Vaping Use    Vaping status: Some Days    Substances: Nicotine   Substance Use Topics    Alcohol use: Not Currently     Comment: Socially    Drug use: Not Currently      E-Cigarette/Vaping    E-Cigarette Use Current Some Day User       E-Cigarette/Vaping Substances    Nicotine Yes       I have reviewed and agree with the history as documented.     Anabell is a 33 y/o male with pertinent PMHx of back pain presenting to the ED with low back pain with radiation  down the left leg. He was recently seen in the ED on 11/21 and 11/22 for the same complaint. He reports prior to his pain starting he was doing heavy lifting and believes this is what caused his back pain. He was admitted on 11/22 due to ambulatory dysfunction. CT imaging was performed and found no acute fractures or spinal stenosis. MRI imaging was ordered but not performed due to claustrophobia. When patient was discharged, patient reported a significant improvement in his symptoms. However, since being discharged, his back pain has returned and again becoming increasingly difficult for him to ambulate. Patient is scheduled for physical therapy but not until the beginning of December. He has been taking gabapentin 100 mg, Toradol 10 mg, Robaxin 500 mg, and using lidocaine patches with minimal relief. At this time he reports no new weakness, numbness, or new radiation of pain. He denies saddle anesthesia, bowel or bladder incontinence, or urinary retention. His pain is localized to the lumbar spine with radiation to the paraspinal region and down his left leg. He states the pain shoots down the anterior and posterior aspect of his left upper leg and at times he gets pain that shoots down to his left heel. He denies any headaches, lightheadedness, dizziness, changes in vision, chest pain, SOB, abdominal pain, N/V/D, dysuria or paresthesias. He states that his left leg at times feels weak like it will give out on him, and that this has been happening since the onset of his back pain.       History provided by:  Patient   used: No    Back Pain  Location:  Lumbar spine  Quality:  Shooting  Radiates to:  L posterior upper leg, L thigh and L foot  Pain is:  Same all the time  Timing:  Constant  Context: recent injury    Associated symptoms: leg pain and weakness    Associated symptoms: no abdominal pain, no bladder incontinence, no bowel incontinence, no chest pain, no dysuria, no fever, no headaches,  no numbness, no paresthesias and no perianal numbness        Review of Systems   Constitutional:  Negative for chills, diaphoresis, fatigue and fever.   HENT:  Negative for congestion, rhinorrhea, sinus pressure and sore throat.    Eyes:  Negative for discharge and redness.   Respiratory:  Negative for cough and shortness of breath.    Cardiovascular:  Negative for chest pain and palpitations.   Gastrointestinal:  Negative for abdominal pain, bowel incontinence, diarrhea, nausea and vomiting.   Genitourinary:  Negative for bladder incontinence, dysuria and flank pain.   Musculoskeletal:  Positive for back pain. Negative for joint swelling, neck pain and neck stiffness.   Skin:  Negative for rash.   Neurological:  Positive for weakness. Negative for dizziness, light-headedness, numbness, headaches and paresthesias.           Objective       ED Triage Vitals [11/29/24 1454]   Temperature Pulse Blood Pressure Respirations SpO2 Patient Position - Orthostatic VS   99 °F (37.2 °C) 71 157/84 18 99 % Lying      Temp Source Heart Rate Source BP Location FiO2 (%) Pain Score    Oral Monitor Right arm -- 10 - Worst Possible Pain      Vitals      Date and Time Temp Pulse SpO2 Resp BP Pain Score FACES Pain Rating User   11/29/24 1917 -- 64 100 % 16 140/85 7 -- CG   11/29/24 1705 -- -- -- -- -- 5 -- CF   11/29/24 1540 -- -- -- -- -- 10 - Worst Possible Pain -- CF   11/29/24 1454 99 °F (37.2 °C) 71 99 % 18 157/84 10 - Worst Possible Pain -- CF            Physical Exam  Vitals and nursing note reviewed.   Constitutional:       General: He is in acute distress.      Appearance: He is obese. He is not ill-appearing.   HENT:      Head: Normocephalic and atraumatic.      Nose: Nose normal. No congestion or rhinorrhea.      Mouth/Throat:      Mouth: Mucous membranes are moist.      Pharynx: Oropharynx is clear. No oropharyngeal exudate or posterior oropharyngeal erythema.   Eyes:      General:         Right eye: No discharge.          Left eye: No discharge.      Extraocular Movements: Extraocular movements intact.      Conjunctiva/sclera: Conjunctivae normal.      Pupils: Pupils are equal, round, and reactive to light.   Cardiovascular:      Rate and Rhythm: Normal rate and regular rhythm.      Pulses: Normal pulses.      Heart sounds: Normal heart sounds. No murmur heard.  Pulmonary:      Effort: Pulmonary effort is normal. No respiratory distress.      Breath sounds: Normal breath sounds. No wheezing, rhonchi or rales.   Abdominal:      General: Bowel sounds are normal.      Palpations: Abdomen is soft.      Tenderness: There is no abdominal tenderness. There is no guarding or rebound.   Musculoskeletal:         General: Tenderness present.      Cervical back: Normal range of motion and neck supple. No rigidity or tenderness.      Lumbar back: Tenderness present. No swelling or deformity. Decreased range of motion. Positive left straight leg raise test. Negative right straight leg raise test.      Right lower leg: No edema.      Left lower leg: No edema.   Skin:     General: Skin is warm and dry.      Capillary Refill: Capillary refill takes less than 2 seconds.   Neurological:      Mental Status: He is alert and oriented to person, place, and time.   Psychiatric:         Mood and Affect: Mood normal.         Behavior: Behavior normal.         Results Reviewed       None            No orders to display       Procedures    ED Medication and Procedure Management   Prior to Admission Medications   Prescriptions Last Dose Informant Patient Reported? Taking?   Vitamin D, Ergocalciferol, 64737 units CAPS   No No   Sig: Take 50,000 Units by mouth once a week Do not start before December 1, 2024.   gabapentin (NEURONTIN) 100 mg capsule   No No   Sig: Take 1 capsule (100 mg total) by mouth 3 (three) times a day for 10 days   ketorolac (TORADOL) 10 mg tablet   No No   Sig: Take 1 tablet (10 mg total) by mouth every 12 (twelve) hours for 5 days    levETIRAcetam (KEPPRA) 500 mg tablet   No No   Sig: Take 1 tablet (500 mg total) by mouth every 12 (twelve) hours   lidocaine (Lidoderm) 5 %   No No   Sig: Apply 1 patch topically over 12 hours daily Remove & Discard patch within 12 hours or as directed by MD   methocarbamol (ROBAXIN) 500 mg tablet   No No   Sig: Take 1 tablet (500 mg total) by mouth every 6 (six) hours for 5 days      Facility-Administered Medications: None     Current Discharge Medication List        CONTINUE these medications which have NOT CHANGED    Details   gabapentin (NEURONTIN) 100 mg capsule Take 1 capsule (100 mg total) by mouth 3 (three) times a day for 10 days  Qty: 30 capsule, Refills: 0    Associated Diagnoses: Low back pain; Acute right-sided low back pain with sciatica, sciatica laterality unspecified      levETIRAcetam (KEPPRA) 500 mg tablet Take 1 tablet (500 mg total) by mouth every 12 (twelve) hours  Qty: 60 tablet, Refills: 0    Associated Diagnoses: Seizure (HCC)      lidocaine (Lidoderm) 5 % Apply 1 patch topically over 12 hours daily Remove & Discard patch within 12 hours or as directed by MD  Qty: 7 patch, Refills: 0    Associated Diagnoses: Low back pain      Vitamin D, Ergocalciferol, 14757 units CAPS Take 50,000 Units by mouth once a week Do not start before December 1, 2024.  Qty: 1 capsule, Refills: 0    Associated Diagnoses: Vitamin D deficiency           STOP taking these medications       ketorolac (TORADOL) 10 mg tablet Comments:   Reason for Stopping:         methocarbamol (ROBAXIN) 500 mg tablet Comments:   Reason for Stopping:             No discharge procedures on file.  ED SEPSIS DOCUMENTATION   Time reflects when diagnosis was documented in both MDM as applicable and the Disposition within this note       Time User Action Codes Description Comment    11/29/2024  5:16 PM Ernesto Amor Add [M54.40] Back pain of lumbar region with sciatica     11/29/2024  5:17 PM Ernesto Amor Add [M54.50] Low back pain      11/29/2024  5:17 PM Ernesto Amor Add [M54.40] Acute right-sided low back pain with sciatica, sciatica laterality unspecified                  Ernesto Amor PA-C  11/29/24 2030

## 2024-11-30 VITALS
RESPIRATION RATE: 18 BRPM | DIASTOLIC BLOOD PRESSURE: 90 MMHG | HEART RATE: 64 BPM | SYSTOLIC BLOOD PRESSURE: 146 MMHG | BODY MASS INDEX: 38.12 KG/M2 | WEIGHT: 243.39 LBS | OXYGEN SATURATION: 100 % | TEMPERATURE: 98 F

## 2024-11-30 PROCEDURE — 99239 HOSP IP/OBS DSCHRG MGMT >30: CPT | Performed by: HOSPITALIST

## 2024-11-30 PROCEDURE — 97165 OT EVAL LOW COMPLEX 30 MIN: CPT

## 2024-11-30 PROCEDURE — 97162 PT EVAL MOD COMPLEX 30 MIN: CPT

## 2024-11-30 PROCEDURE — 99244 OFF/OP CNSLTJ NEW/EST MOD 40: CPT | Performed by: ORTHOPAEDIC SURGERY

## 2024-11-30 RX ORDER — METHOCARBAMOL 500 MG/1
500 TABLET, FILM COATED ORAL 3 TIMES DAILY PRN
Qty: 30 TABLET | Refills: 0 | Status: SHIPPED | OUTPATIENT
Start: 2024-11-30

## 2024-11-30 RX ORDER — GABAPENTIN 100 MG/1
200 CAPSULE ORAL 3 TIMES DAILY
Qty: 180 CAPSULE | Refills: 0 | Status: SHIPPED | OUTPATIENT
Start: 2024-11-30

## 2024-11-30 RX ADMIN — METHOCARBAMOL 500 MG: 500 TABLET ORAL at 05:10

## 2024-11-30 RX ADMIN — GABAPENTIN 100 MG: 100 CAPSULE ORAL at 05:10

## 2024-11-30 RX ADMIN — GABAPENTIN 200 MG: 100 CAPSULE ORAL at 08:48

## 2024-11-30 RX ADMIN — LEVETIRACETAM 500 MG: 500 TABLET, FILM COATED ORAL at 08:48

## 2024-11-30 RX ADMIN — HEPARIN SODIUM 5000 UNITS: 5000 INJECTION INTRAVENOUS; SUBCUTANEOUS at 05:10

## 2024-11-30 RX ADMIN — KETOROLAC TROMETHAMINE 30 MG: 30 INJECTION, SOLUTION INTRAMUSCULAR; INTRAVENOUS at 09:55

## 2024-11-30 RX ADMIN — METHOCARBAMOL 500 MG: 500 TABLET ORAL at 11:29

## 2024-11-30 NOTE — ASSESSMENT & PLAN NOTE
Assessment:  34 year old Male with atraumatic low back pain with Left sided sciatica     Plan:  No acute surgical orthopedic intervention is required at this time.  Patient has atraumatic low back pain with left-sided sciatica symptoms.  Recommend nonnarcotic pain control with consideration of the addition of steroid use to decrease inflammation and pain.  Discussed at length with the patient that we do not offer back injections as an inpatient.  Instructed that at this time our goal is to decrease his pain is much as possible as well as to get him moving with physical therapy.  Instructed that we will place a referral for him to follow-up with a spine and pain provider as an outpatient for consideration of corticosteroid injections to the lumbar spine.  All questions and concerns were answered at the bedside.  Patient agrees with this plan.  Pain control PRN  Recommend PT/OT  Medical management per SLIM  Continue DVT ppx per SLIM  Orthopedics is signing off. We can be re-consulted or reached out to with any other questions or concerns.  Recommend acute Spine and Pain Management follow-up, referral placed and appointment scheduling information with be provide in DC instructions

## 2024-11-30 NOTE — ASSESSMENT & PLAN NOTE
Persistent pain and difficulty w/mobility after recent hospitalization.  Pain radiates to ankle w/SLR.  Mechanism unclear but No cauda equina s/sx.   No recent illness or autoimmune hx in family.  Pt is extremely frustrated about his decrease in mobility and difficulty functioning in his house as his partner is not strong enough to help w/his ambulation/helping him w/transfers  -Ct lumbosacral spine negative for foraminal stenosis/central stenosis  -resume home regimen of scheduled tylenol 975mg q8h, switch to IV toradol for mod/severe pain, continue gabapentin (pt was taking 200mg qhs) will uptitrate to 200mg tid, robaxin  -d/w pt will benefit from having urinal near recliner to help avoid emergencies w/soiling, and will add stool softener given decreased mobility  -reconsult PT/OT for recs regarding tools to help w/ADLs techniques for transfer and evaluation of transfer mechanics  -consult ortho given some particular tenderness around PSIS for possible benefits for epidural injection vs PSIS injection  -set expectations about course of recovery for back pain, need/reason to avoid narcotics given length of time to recovery and risk of tolerance and addt'l multimodal therapy options including uptitrating gabapentin and also possibly seeking evaluation for medical marijuana to help augment pain relief as pt had success with this once since diagnosis although he does not normally smoke or use THC

## 2024-11-30 NOTE — PHYSICAL THERAPY NOTE
PHYSICAL THERAPY EVALUATION          Patient Name: Anabell Ray  Today's Date: 11/30/2024 11/30/24 1157   PT Last Visit   PT Visit Date 11/30/24   Note Type   Note type Evaluation   Pain Assessment   Pain Assessment Tool 0-10   Pain Score No Pain   Restrictions/Precautions   Other Precautions Pain   Home Living   Type of Home Apartment   Home Layout One level;Stairs to enter with rails   Bathroom Shower/Tub Tub/shower unit   Bathroom Toilet Standard   Bathroom Equipment Grab bars in shower   Home Equipment Walker;Cane   Additional Comments second fl apt w 13 RAJ. reports spouse wants him to get a wc and is working on getting him a scooter   Prior Function   Level of [place] Independent with ADLs;Independent with functional mobility;Independent with IADLS   Lives With Family   IADLs Independent with driving;Independent with meal prep;Independent with medication management   Falls in the last 6 months 1 to 4   Vocational Full time employment   Comments indep at baseline/ prior to onset of back pain   General   Additional Pertinent History pt admitted 11/29/24 for lumbago. up and oob orders. reports onset of back pain when trying to go to the bathroom. states radiating pain and n/t to LLE. pain not improved w at home pain medication however improved w pain medication regime in hospital. Hx of seizure disorder and obesity, psychiatric disorder   Cognition   Overall Cognitive Status WFL   Arousal/Participation Cooperative   Orientation Level Oriented to person;Oriented to place;Oriented to situation   Memory Within functional limits   Following Commands Follows all commands and directions without difficulty   Bed Mobility   Supine to Sit 5  Supervision   Additional items Bedrails;HOB elevated;Increased time required;Other   Additional Comments use of therapist to pull to sit up   Transfers   Sit to Stand 5  Supervision   Additional items Increased time required    Ambulation/Elevation   Gait pattern Improper Weight shift;Inconsistent joanne;Excessively slow   Gait Assistance 5  Supervision   Additional items Assist x 1   Assistive Device Rolling walker;SPC;None   Distance about 400'   Stair Management Assistance 6  Modified independent   Additional items Assist x 1;Verbal cues  (initial cues for sequencing to compensate for LLE pain)   Stair Management Technique Two rails;Step to pattern;Foreward   Number of Stairs 15   Ambulation/Elevation Additional Comments  steps, ascend 9 steps, descend 6 steps   Balance   Static Standing Fair +   Dynamic Standing Fair   Ambulatory Fair   Endurance Deficit   Endurance Deficit No   Activity Tolerance   Activity Tolerance Patient tolerated treatment well   Medical Staff Made Aware Lynn OT   Nurse Made Aware Brie RN   Assessment   Prognosis Good   Assessment Anabell Ray is a 34 y.o. male admitted to [facility] on 11/29/2024 for Lumbago. PT was consulted and pt was seen on 11/30/2024 for mobility assessment and d/c planning. Pt presents w medium fall risk. Reports back pain improved w pain medication/ since admission. At baseline is indep. Pt is currently functioning at a S for transfers, S>mod I for ambulation and stair negotiation. Pt demonstrated ability to ambulate community distances. Able to progress from RW> cane> no AD without LOB or exacerbation of pain. Negotiating steps without difficulty demonstrating appropriate sequencing. No barriers to dc home at this time. Would benefit from OP PT f/u for back pain given persistent sx.   Barriers to Discharge None   Plan   PT Frequency   (d/c PT)   Discharge Recommendation   Rehab Resource Intensity Level, PT III (Minimum Resource Intensity)  (OPPT for back pain, ?comp spine)   AM-PAC Basic Mobility Inpatient   Turning in Flat Bed Without Bedrails 4   Lying on Back to Sitting on Edge of Flat Bed Without Bedrails 4   Moving Bed to Chair 4   Standing Up From Chair Using Arms 4    Walk in Room 4   Climb 3-5 Stairs With Railing 4   Basic Mobility Inpatient Raw Score 24   Basic Mobility Standardized Score 57.68   [facility] Highest Level Of Mobility   -HLM Goal 8: Walk 250 feet or more   -HLM Achieved 8: Walk 250 feet ot more   End of Consult   Patient Position at End of Consult Standing;Other (comment)  (in bathroom w OT)   History: co - morbidities, current experience including fall risk  Exam: impairments in systems including multiple body structures involved; neuromuscular (balance, gait, transfers, sensation), cognition; activity limitations  (difficulties executing an action); participation restrictions (problems associated w involvement in life situations), AM-PAC  Clinical: stable/unpredictable  Complexity: moderate    Nidia Grant, PT

## 2024-11-30 NOTE — OCCUPATIONAL THERAPY NOTE
Occupational Therapy Evaluation     Patient Name: Anabell Ray  Today's Date: 11/30/2024  Problem List  Principal Problem:    Lumbago  Active Problems:    Seizure disorder (HCC)    Nicotine dependence    Insomnia    Obesity    Past Medical History  Past Medical History:   Diagnosis Date    Hypertension     Psychiatric disorder     Seizures (HCC)      Past Surgical History  History reviewed. No pertinent surgical history.        11/30/24 1143   OT Last Visit   OT Visit Date 11/30/24   Note Type   Note type Evaluation   Additional Comments greeted in supine and agreeable.   Pain Assessment   Pain Assessment Tool 0-10   Pain Score No Pain   Restrictions/Precautions   Weight Bearing Precautions Per Order No   Other Precautions Pain   Home Living   Type of Home Apartment  (2nd floor apt)   Home Layout One level;Stairs to enter with rails  (1 FOS to enter)   Bathroom Shower/Tub Tub/shower unit   Bathroom Toilet Standard   Bathroom Equipment Grab bars in shower   Home Equipment Walker;Cane   Prior Function   Level of [place] Independent with ADLs;Independent with functional mobility   Lives With Family   Receives Help From Family   IADLs Independent with driving;Independent with meal prep;Independent with medication management   Falls in the last 6 months 1 to 4   Vocational Full time employment   ADL   Where Assessed Edge of bed   Eating Assistance 7  Independent   Grooming Assistance 7  Independent   UB Bathing Assistance 6  Modified Independent   LB Bathing Assistance 5  Supervision/Setup   UB Dressing Assistance 6  Modified independent   LB Dressing Assistance 5  Supervision/Setup   Toileting Assistance  6  Modified independent   Bed Mobility   Supine to Sit 5  Supervision   Additional items HOB elevated;Bedrails   Transfers   Sit to Stand 5  Supervision   Additional items Assist x 1;Increased time required;Verbal cues   Stand to Sit 5  Supervision   Additional items Increased time required;Verbal cues    Additional Comments cues for safety and best tech   Functional Mobility   Functional Mobility 5  Supervision   Additional Comments RW vs cane vs no deivce. household distances   Additional items Rolling walker;SPC   Balance   Static Sitting Good   Dynamic Sitting Fair +   Static Standing Fair +   Dynamic Standing Fair   Ambulatory Fair   Activity Tolerance   Activity Tolerance Patient tolerated treatment well   Medical Staff Made Aware PT Nidia   Nurse Made Aware JETT ELLISON Assessment   RUE Assessment WFL   LUE Assessment   LUE Assessment WFL   Hand Function   Gross Motor Coordination Functional   Fine Motor Coordination Functional   Psychosocial   Psychosocial (WDL) WDL   Cognition   Overall Cognitive Status WFL   Arousal/Participation Cooperative   Attention Within functional limits   Orientation Level Oriented X4   Memory Within functional limits   Following Commands Follows all commands and directions without difficulty   Assessment   Assessment Anabell Ray is a 34 y.o. male seen for OT evaluation s/p admit to [facility] on 11/29/2024 w/ Alejandra.  Comorbidities affecting pt's functional performance at time of assessment include:  seizure disorder and obesity, psychiatric disorder . Pt with active OT orders and activity orders for Up and OOB as tolerated. Personal factors affecting pt at time of IE include:RAJ home environment. Prior to admission, pt lives with wife and kids in a single level 2nd floor apt with 1 FOS to enter. Was I with ADLS and mobility with cane vs no device. 1 fall. Drives, works seasonally.  Upon evaluation: Pt currently requires Yudi for UB ADLs, supervision for LB ADLs, Yudi for toileting, supervision for bed mobility, supervision for functional transfers, and supervision RW mobility 2* the following deficits impacting occupational performance:weakness. Pt is currently at their functional baseline and no skilled OT is warranted at this time. From OT standpoint, recommendation at  time of d/c would be no rehab needs vs OP PT for back pain.   Plan   OT Frequency Eval only  (DC OT)   Discharge Recommendation   Rehab Resource Intensity Level, OT No post-acute rehabilitation needs  (OPPT for back pain)   Equipment Recommended Reacher ($);Sock aid ($)   AM-PAC Daily Activity Inpatient   Lower Body Dressing 3   Bathing 3   Toileting 4   Upper Body Dressing 4   Grooming 4   Eating 4   Daily Activity Raw Score 22   Daily Activity Standardized Score (Calc for Raw Score >=11) 47.1   AM-PAC Applied Cognition Inpatient   Following a Speech/Presentation 4   Understanding Ordinary Conversation 4   Taking Medications 4   Remembering Where Things Are Placed or Put Away 4   Remembering List of 4-5 Errands 4   Taking Care of Complicated Tasks 4   Applied Cognition Raw Score 24   Applied Cognition Standardized Score 62.21   Hawa Couch, OT

## 2024-11-30 NOTE — CONSULTS
Progress Note - Orthopedics   Name: Anabell Ray 34 y.o. male I MRN: [id number]  Unit/Bed#: E2 -01 I Date of Admission: 11/29/2024   Date of Service: 11/30/2024 I Hospital Day: 1     Assessment & Plan  Lumbago  Assessment:  34 year old Male with atraumatic low back pain with Left sided sciatica     Plan:  No acute surgical orthopedic intervention is required at this time.  Patient has atraumatic low back pain with left-sided sciatica symptoms.  Recommend nonnarcotic pain control with consideration of the addition of steroid use to decrease inflammation and pain.  Discussed at length with the patient that we do not offer back injections as an inpatient.  Instructed that at this time our goal is to decrease his pain is much as possible as well as to get him moving with physical therapy.  Instructed that we will place a referral for him to follow-up with a spine and pain provider as an outpatient for consideration of corticosteroid injections to the lumbar spine.  All questions and concerns were answered at the bedside.  Patient agrees with this plan.  Pain control PRN  Recommend PT/OT  Medical management per SLIM  Continue DVT ppx per SLIM  Orthopedics is signing off. We can be re-consulted or reached out to with any other questions or concerns.  Recommend acute Spine and Pain Management follow-up, referral placed and appointment scheduling information with be provide in DC instructions   Seizure disorder (HCC)  Per SLIM  Nicotine dependence  Per SLIM  Insomnia  Per SLIM  Obesity  Per SLIM      Subjective   34 y.o.male with past medical history of seizure disorder, insomnia, obesity, and nicotine dependence presents to the hospital with recurrent/ worsening low back pain.  Patient initially presented to hospital 1121 and then again on 1122 for the same complaint of low back pain with radiating left leg pain.  Patient was admitted admitted from 11/22/2024 through 11/24/2024 for this low back pain and pain  control.  CT imaging was obtained at that time that was unremarkable for any pathology.  Patient was unable to proceed forward with MRI due to claustrophobia.  Patient was discharged with a muscle relaxer, lidocaine patches, Toradol tabs, and gabapentin 100 mg 3 times a day.  Patient was then experiencing weakness yesterday and 11/29/2024 which ultimately led him to come back to the ER.  Patient was then subsequently admitted again for pain control and evaluation.     Orthopedics was consulted regarding his low back pain.  Patient's history was reviewed at length with him.  Patient denies any history of trauma to the lumbar spine.  Patient states that he has had history of past lower back pain even previous to these most recent episodes that occurred in November 2024.  Patient states that in the past he would only experience low back pain for about a day or so that would ultimately resolve on its own.  Patient denied having any pain or numbness or tingling that would radiate down the bilateral lower legs when he would experience low back pain previously.  Patient reports that the low back pain that he is experiencing now is different than it was previously.  Patient states that the pain is located in the same region of the lumbar spine but states that the pain is more intense than it was previously.  Patient states that he feels as though there is a intense pinching occurring in his lumbar spine.  Patient also notes that he is having pain as well as numbness and tingling that is radiating down the left lower leg and states that that is different compared to the back pain he had previously.  Patient states he feels as though there is knots on the anterior and posterior aspect of the leg creating a cramping pain.  Patient also notes having numbness and tingling sensation that radiates down the lateral and posterior aspect of the left leg only.  Patient does note weakness of the left leg.  Patient reports that  yesterday he was unable to tolerate weightbearing to the left leg due to significant pain as well as weakness.  Patient states that today he was able to get up with his walker and ambulate to the bathroom which was different than it was previously.  Patient states that his pain is much better under control today than it was yesterday and believes that is attributed to the nursing staff giving his pain medication when it is due on the clock.  Patient believes that when he was at home he may not have been as on top of maintaining his scheduled pain medication.  Patient denies any loss of bowel or bowel control.  Patient denies any saddle anesthesia.  Patient states that he is having bowel movements and denies difficulty with bowel or bladder movements.      Objective :  Temp:  [98 °F (36.7 °C)-99 °F (37.2 °C)] 98 °F (36.7 °C)  HR:  [64-72] 64  BP: (140-157)/(84-97) 146/90  Resp:  [16-18] 18  SpO2:  [99 %-100 %] 100 %  O2 Device: None (Room air)    Physical Exam  Vitals reviewed.   Constitutional:       Appearance: Normal appearance.   HENT:      Head: Normocephalic and atraumatic.      Right Ear: External ear normal.      Left Ear: External ear normal.      Nose: Nose normal.      Mouth/Throat:      Mouth: Mucous membranes are moist.   Eyes:      Extraocular Movements: Extraocular movements intact.      Conjunctiva/sclera: Conjunctivae normal.      Pupils: Pupils are equal, round, and reactive to light.   Cardiovascular:      Comments: No apparent distress  Pulmonary:      Effort: Pulmonary effort is normal.   Abdominal:      General: Abdomen is flat.   Musculoskeletal:      Cervical back: Normal range of motion and neck supple.   Skin:     General: Skin is warm.      Capillary Refill: Capillary refill takes less than 2 seconds.   Neurological:      General: No focal deficit present.      Mental Status: He is alert and oriented to person, place, and time.   Psychiatric:         Mood and Affect: Mood normal.  "      Musculoskeletal:   Lumbar Spine Exam  Alignment:  Normal lumbar alignment.  Inspection:   Spine was visualized in its entirety and there is no erythema or ecchymosis visualized about the entirety of the spine specifically the lumbar spine.  Skin is intact without trauma.  Palpation: Tenderness to palpation about the lumbar spinous processes as well as the left paraspinal musculature.  No tenderness to palpation of the right lumbar paraspinal musculature. No tenderness to palpation about the cervical or thoracic spine.   Strength: Right side: Quadriceps 5/5. Hamstrings 5/5. Anterior tibialis 5/5. Gastroc/Soleus 5/5. EHL 5/5. FHL 5/5.  Left side: Quadriceps 5/5. Hamstrings 5/5. Anterior tibialis 5/5. Gastroc/Soleus 5/5. EHL 4/5. FHL 5/5.  Neurovascular:  Right side: Sensation intact in DP/SP/Hameed/Sa/T nerve distributions.  Palpable DP & PT pulses. Brisk capillary refill in all toes. Left side: diminished sensation about the big toe. Sensation intact in SP/Hameed/Sa/T nerve distributions.  Gait:  Not tested.       Lab Results: I have reviewed the following results:  No results for input(s): \"WBC\", \"HGB\", \"HCT\", \"PLT\", \"BANDSPCT\", \"BUN\", \"CREATININE\", \"PTT\", \"INR\", \"ESR\", \"CRP\" in the last 72 hours.  Blood Culture:  No results found for: \"BLOODCX\"  Wound Culture: No results found for: \"WOUNDCULT\"    Leticia Costa PA-C    "

## 2024-11-30 NOTE — ASSESSMENT & PLAN NOTE
No significant findings on imaging    Seen by orthopedics.  They just recommend pain control.  If it does not get better they can do physical therapy.  But he is now down to 1 out of 10 pain on Neurontin and Robaxin.  I am going to send him home with.  Will have him see his PCP.  If he worsens they can refer him to physical therapy.

## 2024-11-30 NOTE — H&P
H&P - Hospitalist   Name: Anabell Ray 34 y.o. male I MRN: [id number]  Unit/Bed#: E2 -01 I Date of Admission: 11/29/2024   Date of Service: 11/29/2024 I Hospital Day: 1     Assessment & Plan  Lumbago  Persistent pain and difficulty w/mobility after recent hospitalization.  Pain radiates to ankle w/SLR.  Mechanism unclear but No cauda equina s/sx.   No recent illness or autoimmune hx in family.  Pt is extremely frustrated about his decrease in mobility and difficulty functioning in his house as his partner is not strong enough to help w/his ambulation/helping him w/transfers  -Ct lumbosacral spine negative for foraminal stenosis/central stenosis  -resume home regimen of scheduled tylenol 975mg q8h, switch to IV toradol for mod/severe pain, continue gabapentin (pt was taking 200mg qhs) will uptitrate to 200mg tid, robaxin  -d/w pt will benefit from having urinal near recliner to help avoid emergencies w/soiling, and will add stool softener given decreased mobility  -reconsult PT/OT for recs regarding tools to help w/ADLs techniques for transfer and evaluation of transfer mechanics  -consult ortho given some particular tenderness around PSIS for possible benefits for epidural injection vs PSIS injection  -set expectations about course of recovery for back pain, need/reason to avoid narcotics given length of time to recovery and risk of tolerance and addt'l multimodal therapy options including uptitrating gabapentin and also possibly seeking evaluation for medical marijuana to help augment pain relief as pt had success with this once since diagnosis although he does not normally smoke or use THC  Seizure disorder (HCC)  Continue keppra bid  Nicotine dependence  Patch provided  Insomnia  Cane take benadryl qhs  Obesity  Bmi 38 noted        VTE Pharmacologic Prophylaxis:   Moderate Risk (Score 3-4) - Pharmacological DVT Prophylaxis Ordered: enoxaparin (Lovenox).  Code Status: Level 1 - Full Code   Discussion  with family: Updated  (significant other) via phone.    Anticipated Length of Stay: Patient will be admitted on an observation basis with an anticipated length of stay of less than 2 midnights secondary to back pain.    History of Present Illness   Chief Complaint: persistent left back pain ambulatory dysfunction    Anabell Ray is a 34 y.o. male with a PMH of obesity, tobacco use, seizures who presents with persistent back pain.  Pt felt a popping sensation in his left low back about 1 week pta.  This was not associated w/any heavy lifting or activity and no recent illnesses preceding or family hx or personal hx of autoimmune disorders.  He has been off of work as he is off season since about a month ago. He was hospitalized from 11/22/24 thru 11/24/24 for this after an ED visit on 11/21/24.  He was discharged on a muscle relaxer, lidocaine patch, toradol tabs, and gabapentin 100mg TID.  He notes he's been taking toradol 200mg qhs after d/w his significant other.  He notes pain relief is minimal w/oral meds he received and also take a good 60-90 minutes so he's struggling with ambulation as it's hard to get out of his recliner as you have to kick down to lower the foot rest and he cannot do that himself presently w/o significant pain.  He has been worried about urinary incontinence due to his mobility, and it has been 3 days since his last BM and notes poor sleep due to his pain.  He feels like he's at the breaking point as he cannot help w/ADLs around the house or raising his 2 year old with his significant other.  He did trial an edible w/significant pain releif but also was too much and made him fall asleep.  He is struggling w/transfers to standing from a seated position and has had a few falls with this.  His significant other is quite petite and unable to help w/transfer assistance.  He returned to ED and given persistent pain admission was requested.    Review of Systems   Constitutional:   Negative for chills and fever.   Respiratory:  Negative for shortness of breath.    Cardiovascular:  Negative for chest pain.   Gastrointestinal:  Negative for abdominal pain, diarrhea, nausea and vomiting.   Musculoskeletal:  Positive for back pain and gait problem.   Neurological:  Positive for light-headedness.   Psychiatric/Behavioral:  Positive for sleep disturbance.    All other systems reviewed and are negative.      Historical Information   Past Medical History:   Diagnosis Date    Hypertension     Psychiatric disorder     Seizures (HCC)      History reviewed. No pertinent surgical history.  Social History     Tobacco Use    Smoking status: Every Day     Average packs/day: 0.5 packs/day for 11.0 years (5.5 ttl pk-yrs)     Types: Cigarettes     Start date: 2013    Smokeless tobacco: Never   Vaping Use    Vaping status: Some Days    Substances: Nicotine   Substance and Sexual Activity    Alcohol use: Not Currently     Comment: Socially    Drug use: Not Currently    Sexual activity: Yes     Partners: Female     E-Cigarette/Vaping    E-Cigarette Use Current Some Day User      E-Cigarette/Vaping Substances    Nicotine Yes      Family history non-contributory  Social History:  Marital Status: Single   Occupation:   Patient Pre-hospital Living Situation:   Patient Pre-hospital Level of Mobility:   Patient Pre-hospital Diet Restrictions:     Meds/Allergies   I have reviewed home medications with patient personally.  Prior to Admission medications    Medication Sig Start Date End Date Taking? Authorizing Provider   ketorolac (TORADOL) 10 mg tablet Take 1 tablet (10 mg total) by mouth every 12 (twelve) hours for 5 days 11/29/24 11/29/24 Yes Ernesto Amor PA-C   methocarbamol (ROBAXIN) 500 mg tablet Take 1 tablet (500 mg total) by mouth every 6 (six) hours for 5 days 11/29/24 11/29/24 Yes Ernesto Amor PA-C   gabapentin (NEURONTIN) 100 mg capsule Take 1 capsule (100 mg total) by mouth 3 (three) times a  day for 10 days 11/24/24 12/4/24  Javi Jaffe DO   levETIRAcetam (KEPPRA) 500 mg tablet Take 1 tablet (500 mg total) by mouth every 12 (twelve) hours 1/2/21 2/1/21  FRANTZ Cramer   lidocaine (Lidoderm) 5 % Apply 1 patch topically over 12 hours daily Remove & Discard patch within 12 hours or as directed by MD 11/23/24   Walter Keene, DO   Vitamin D, Ergocalciferol, 05617 units CAPS Take 50,000 Units by mouth once a week Do not start before December 1, 2024. 12/1/24 2/2/25  Javi Jaffe DO   ketorolac (TORADOL) 10 mg tablet Take 1 tablet (10 mg total) by mouth every 12 (twelve) hours for 5 days 11/24/24 11/29/24  Javi Jaffe DO   methocarbamol (ROBAXIN) 500 mg tablet Take 1 tablet (500 mg total) by mouth every 6 (six) hours for 5 days 11/24/24 11/29/24  Javi Jaffe, DO     No Known Allergies    Objective :  Temp:  [98.6 °F (37 °C)-99 °F (37.2 °C)] 98.6 °F (37 °C)  HR:  [64-72] 72  BP: (140-157)/(84-97) 152/85  Resp:  [16-18] 16  SpO2:  [99 %-100 %] 100 %  O2 Device: None (Room air)    Physical Exam  Vitals reviewed.   Constitutional:       General: He is not in acute distress.     Appearance: He is not ill-appearing, toxic-appearing or diaphoretic.      Comments: Frustrated, appears uncomfortable stated age nad   HENT:      Head: Normocephalic and atraumatic.      Right Ear: External ear normal.      Left Ear: External ear normal.   Eyes:      Extraocular Movements: Extraocular movements intact.   Cardiovascular:      Rate and Rhythm: Normal rate and regular rhythm.   Pulmonary:      Effort: No respiratory distress.      Breath sounds: No rhonchi or rales.   Abdominal:      General: There is no distension.      Palpations: There is no mass.      Tenderness: There is no abdominal tenderness. There is no guarding or rebound.      Hernia: No hernia is present.   Musculoskeletal:        Back:       Right lower leg: No edema.      Left lower leg: No edema.   Skin:     General:  Skin is warm and dry.   Neurological:      Mental Status: He is alert. Mental status is at baseline.   Psychiatric:      Comments: Appropiate affect          Lines/Drains:            Lab Results: I have reviewed the following results:  Results from last 7 days   Lab Units 11/24/24  0538 11/23/24  0225   WBC Thousand/uL 5.66 8.30   HEMOGLOBIN g/dL 13.5 14.4   HEMATOCRIT % 42.1 44.6   PLATELETS Thousands/uL 210 233   SEGS PCT %  --  60   LYMPHO PCT %  --  31   MONO PCT %  --  7   EOS PCT %  --  2     Results from last 7 days   Lab Units 11/24/24  0538   SODIUM mmol/L 139   POTASSIUM mmol/L 3.9   CHLORIDE mmol/L 107   CO2 mmol/L 29   BUN mg/dL 20   CREATININE mg/dL 0.92   ANION GAP mmol/L 3*   CALCIUM mg/dL 8.9   GLUCOSE RANDOM mg/dL 102             Lab Results   Component Value Date    HGBA1C 5.9 (H) 11/23/2024    HGBA1C 6.1 (H) 03/12/2024           CT lumbar spine reviewed from prior admission      Administrative Statements       ** Please Note: This note has been constructed using a voice recognition system. **

## 2024-11-30 NOTE — DISCHARGE SUMMARY
DIscharge Summary - Hospitalist   Name: Anabell Ray 34 y.o. male I MRN: [id number]  Unit/Bed#: E2 -01 I Date of Admission: 11/29/2024   Date of Service: 11/30/2024 I Hospital Day: 1    Assessment & Plan  Lumbago  No significant findings on imaging    Seen by orthopedics.  They just recommend pain control.  If it does not get better they can do physical therapy.  But he is now down to 1 out of 10 pain on Neurontin and Robaxin.  I am going to send him home with.  Will have him see his PCP.  If he worsens they can refer him to physical therapy.  Seizure disorder (HCC)  Continue Keppra  Nicotine dependence  Patch provided  Insomnia  Cane take benadryl qhs  Obesity  Bmi 38 noted        Medical Problems       Discharging Physician / Practitioner: Gagan Cano DO  PCP: No primary care provider on file.  Admission Date:   Admission Orders (From admission, onward)       Ordered        11/29/24 2314  Place in Observation  Once            11/29/24 1928  INPATIENT ADMISSION  Once                        Discharge Date: 11/30/24    Consultations During Hospital Stay:  IP CONSULT TO ORTHOPEDIC SURGERY     Procedures Performed:   * No surgery found *       Lab Results:   Results from last 7 days   Lab Units 11/24/24  0538   WBC Thousand/uL 5.66   HEMOGLOBIN g/dL 13.5   HEMATOCRIT % 42.1   MCV fL 83   PLATELETS Thousands/uL 210     Results from last 7 days   Lab Units 11/24/24  0538   SODIUM mmol/L 139   POTASSIUM mmol/L 3.9   CHLORIDE mmol/L 107   CO2 mmol/L 29   BUN mg/dL 20   CREATININE mg/dL 0.92   CALCIUM mg/dL 8.9   EGFR ml/min/1.73sq m 106   GLUCOSE RANDOM mg/dL 102                   Reason for Admission:   Back Pain (Pt arrives via ems, from home. Pt reports L sided sciatica back pain for x1week. Pt was walking around apt when L leg gave out and pt slowly fell to the floor. Pt has been coming to the ED x3 in the past week.)    Hospital Course:   Anabell Ray is a 34 y.o. male patient who originally  presented to the hospital on 11/29/2024 due to acute low back pain.  Nothing traumatic to cause this.  Had some back spasms.  No bony abnormality on imaging.  He was seen by orthopedics.  They just recommend supportive care.  60 down to 1 out of 10 pain on Neurontin and Robaxin.  I am going to send him home on that.  I will have him see his PCP.  If he is not better in a week they can refer him to physical therapy but I doubt he will need it.        Please see above list of diagnoses and related plan for additional information.     Condition at Discharge: stable     Discharge Day Visit / Exam:   Subjective:  feels well  Minimal back pain  1/10  .    Vitals: Blood Pressure: 146/90 (11/30/24 0712)  Pulse: 64 (11/30/24 0712)  Temperature: 98 °F (36.7 °C) (11/30/24 0712)  Temp Source: Oral (11/30/24 0712)  Respirations: 18 (11/30/24 0712)  Weight - Scale: 110 kg (243 lb 6.2 oz) (11/29/24 1454)  SpO2: 100 % (11/30/24 0712)    Exam:   Physical Exam  Vitals and nursing note reviewed.   Constitutional:       General: He is not in acute distress.     Appearance: He is well-developed.   HENT:      Head: Normocephalic and atraumatic.   Eyes:      Conjunctiva/sclera: Conjunctivae normal.   Cardiovascular:      Rate and Rhythm: Normal rate and regular rhythm.      Heart sounds: No murmur heard.  Pulmonary:      Effort: Pulmonary effort is normal. No respiratory distress.      Breath sounds: Normal breath sounds.   Abdominal:      Palpations: Abdomen is soft.      Tenderness: There is no abdominal tenderness.   Musculoskeletal:         General: No swelling.      Cervical back: Neck supple.      Right lower leg: No edema.      Left lower leg: No edema.   Skin:     General: Skin is warm and dry.      Capillary Refill: Capillary refill takes less than 2 seconds.   Neurological:      Mental Status: He is alert.   Psychiatric:         Mood and Affect: Mood normal.           Discharge instructions/Information to patient and family:    See after visit summary for information provided to patient and family.      Provisions for Follow-Up Care:  See after visit summary for information related to follow-up care and any pertinent home health orders.        Disposition:   Home       Discharge Medications:  See after visit summary for reconciled discharge medications provided to patient and family.      Administrative Statements   I spent 36 minutes discharging the patient. This time was spent on the day of discharge. I had direct contact with the patient on the day of discharge. Greater than 50% of the total time was spent examining patient, answering all patient questions, arranging and discussing plan of care with patient as well as directly providing post-discharge instructions.  Additional time then spent on discharge activities.    **Please Note: This note may have been constructed using a voice recognition system**